# Patient Record
Sex: FEMALE | Race: WHITE | NOT HISPANIC OR LATINO | Employment: OTHER | URBAN - METROPOLITAN AREA
[De-identification: names, ages, dates, MRNs, and addresses within clinical notes are randomized per-mention and may not be internally consistent; named-entity substitution may affect disease eponyms.]

---

## 2017-03-22 ENCOUNTER — APPOINTMENT (OUTPATIENT)
Dept: RADIOLOGY | Facility: HOSPITAL | Age: 82
End: 2017-03-22
Attending: FAMILY MEDICINE
Payer: MEDICARE

## 2017-03-22 ENCOUNTER — OFFICE VISIT (OUTPATIENT)
Dept: FAMILY MEDICINE | Facility: CLINIC | Age: 82
End: 2017-03-22
Payer: MEDICARE

## 2017-03-22 VITALS
SYSTOLIC BLOOD PRESSURE: 122 MMHG | BODY MASS INDEX: 32.32 KG/M2 | RESPIRATION RATE: 20 BRPM | HEIGHT: 58 IN | DIASTOLIC BLOOD PRESSURE: 72 MMHG | TEMPERATURE: 98 F | HEART RATE: 84 BPM | OXYGEN SATURATION: 97 % | WEIGHT: 154 LBS

## 2017-03-22 DIAGNOSIS — M25.659 DECREASED RANGE OF HIP MOVEMENT: ICD-10-CM

## 2017-03-22 DIAGNOSIS — G89.29 CHRONIC PAIN OF BOTH ANKLES: ICD-10-CM

## 2017-03-22 DIAGNOSIS — M25.571 CHRONIC PAIN OF BOTH ANKLES: ICD-10-CM

## 2017-03-22 DIAGNOSIS — M25.659 DECREASED RANGE OF HIP MOVEMENT: Primary | ICD-10-CM

## 2017-03-22 DIAGNOSIS — M25.572 CHRONIC PAIN OF BOTH ANKLES: ICD-10-CM

## 2017-03-22 DIAGNOSIS — E66.9 OBESITY (BMI 30.0-34.9): ICD-10-CM

## 2017-03-22 PROBLEM — K21.9 GERD (GASTROESOPHAGEAL REFLUX DISEASE): Status: ACTIVE | Noted: 2017-03-22

## 2017-03-22 PROBLEM — E66.811 OBESITY (BMI 30.0-34.9): Status: ACTIVE | Noted: 2017-03-22

## 2017-03-22 PROCEDURE — 1159F MED LIST DOCD IN RCRD: CPT | Mod: S$GLB,,, | Performed by: FAMILY MEDICINE

## 2017-03-22 PROCEDURE — 73521 X-RAY EXAM HIPS BI 2 VIEWS: CPT | Mod: 26,,, | Performed by: RADIOLOGY

## 2017-03-22 PROCEDURE — 1125F AMNT PAIN NOTED PAIN PRSNT: CPT | Mod: S$GLB,,, | Performed by: FAMILY MEDICINE

## 2017-03-22 PROCEDURE — 1160F RVW MEDS BY RX/DR IN RCRD: CPT | Mod: S$GLB,,, | Performed by: FAMILY MEDICINE

## 2017-03-22 PROCEDURE — 99213 OFFICE O/P EST LOW 20 MIN: CPT | Mod: S$GLB,,, | Performed by: FAMILY MEDICINE

## 2017-03-22 PROCEDURE — 99999 PR PBB SHADOW E&M-EST. PATIENT-LVL III: CPT | Mod: PBBFAC,,, | Performed by: FAMILY MEDICINE

## 2017-03-22 PROCEDURE — 73521 X-RAY EXAM HIPS BI 2 VIEWS: CPT | Mod: TC,PN

## 2017-03-22 PROCEDURE — 1157F ADVNC CARE PLAN IN RCRD: CPT | Mod: S$GLB,,, | Performed by: FAMILY MEDICINE

## 2017-03-22 RX ORDER — ACETAMINOPHEN 500 MG
1000 TABLET ORAL EVERY 8 HOURS
Qty: 90 TABLET | Refills: 3 | Status: SHIPPED | OUTPATIENT
Start: 2017-03-22 | End: 2018-01-31

## 2017-03-22 NOTE — PROGRESS NOTES
"Routine Office Visit    Patient Name: Lucila Myers    : 1935  MRN: [id number]    Subjective:  Lucila is a 81 y.o. female who presents today for:    1.   Multiple joint pains - including chronic ankle pain which she's been dealing with for years.  She states that the R one is retaining some fluid today. She denies any injuries. She has arthritis in her hands and takes tramadol for this about 3 times a day.  She also has chronic knee pains, lower back pains worse on the L>R, hand pains with deformity.  She also states that ever since she was a younger girl she had hip height inequality, with the R side being elevated about 1/8 of an inch. She does not routinely wear an insole to account for this difference.  "It never really worked."  She's tried a R knee brace in the past with no improvement.  She states she is unable to move around with her 1 and 2 year old great grand kids and she wishes she could do more with them. She walks with a 4 prong cane now.      Past Medical History  Past Medical History:   Diagnosis Date    Arthritis        Past Surgical History  Past Surgical History:   Procedure Laterality Date    APPENDECTOMY      HYSTERECTOMY         Family History  Family History   Problem Relation Age of Onset    Rectal cancer Mother     Alcohol abuse Father        Social History  Social History     Social History    Marital status:      Spouse name: N/A    Number of children: N/A    Years of education: N/A     Occupational History    Not on file.     Social History Main Topics    Smoking status: Never Smoker    Smokeless tobacco: Not on file    Alcohol use No    Drug use: No    Sexual activity: No     Other Topics Concern    Not on file     Social History Narrative       Current Medications  Current Outpatient Prescriptions on File Prior to Visit   Medication Sig Dispense Refill    clotrimazole-betamethasone 1-0.05% (LOTRISONE) cream Apply topically 2 (two) times daily. 45 g 3    " "etodolac (LODINE) 400 MG tablet Take 1 tablet (400 mg total) by mouth 2 (two) times daily. 60 tablet 2    folic acid (FOLVITE) 1 MG tablet   0    omeprazole (PRILOSEC) 20 MG capsule Take 1 capsule (20 mg total) by mouth once daily. 90 capsule 2    tramadol (ULTRAM) 50 mg tablet TAKE 1 TABLET BY MOUTH THREE TIMES DAILY AS NEEDED FOR PAIN. NO MORE THAN 3 TABLETS IN 24 HOURS. 90 tablet 2    gabapentin (NEURONTIN) 100 MG capsule Take 1 capsule (100 mg total) by mouth every evening. 90 capsule 2     No current facility-administered medications on file prior to visit.        Allergies   Review of patient's allergies indicates:  No Known Allergies    Review of Systems (Pertinent positives)  Constititutional: Weight loss, excess fatigue, chills, fever, night sweats, weakness, loss of appetite  Ears: Earache, ringing in ears, discharge, hearing loss, hearing aid, popping, infection Nose: stuffy nose, mouth breathing, post-nasal drip,   Throat: hoarseness, bad breath, swelling, sore throat  Teeth: toothache, caries, dentures, bleeding gums  Lungs: Cough, sputum  Heart: Chest pain, angina, palpitations, extra heart beats  Stomach/Intestine: Heartburn, Nausea, vomiting, diarrhea, indigestion, bloating, constipation  Bones/Muscles/Joints: Joint pain, varicose veins, deformities, back pain, swelling, stiffness  Brain: Numbness, tingling, tremor, fainting, headaches, muscle weakness, frequent falls    /72  Pulse 84  Temp 98.2 °F (36.8 °C) (Oral)   Resp 20  Ht 4' 10" (1.473 m)  Wt 69.9 kg (154 lb)  LMP  (Exact Date)  SpO2 97%  BMI 32.19 kg/m2    GENERAL APPEARANCE: in no apparent distress and well developed and well nourished  RESPIRATORY: appears well, vitals normal, no respiratory distress, acyanotic, normal RR, chest clear, no wheezing, crepitations, rhonchi, normal symmetric air entry  HEART: regular rate and rhythm, S1, S2 normal, no murmur, click, rub or gallop.    NEUROLOGIC: normal without focal findings, " "CN II-XII are intact.  reflexes   Extremities: warm/well perfused.  No abnormal hair patterns.  No clubbing, cyanosis or edema.  +decreased ROM of R hip as patient unable to flex and externally rotate R hip to touch back of heel to her knee.  She is able to do this with her L foot.   SKIN: no rashes, no wounds, no other lesions  PSYCH: Alert, oriented x 3, thought content appropriate, speech normal, pleasant and cooperative, good eye contact, well groomed, recall good, concentration/judgement good and apparently average intelligence.    Assessment/Plan:  Lucila Myers is a 81 y.o. female who presents today for :    Lucila was seen today for arthritis.    Diagnoses and all orders for this visit:    Decreased range of hip movement  -     X-Ray Hips Bilateral 2 View Inc AP Pelvis; Future  -     Suggested tylenol as per below but also physical therapy.  She says, "at my age, you think physical therapy will help?"  We talked about how it may help.  She's tried orthotics but she isn't wearing one now.  She states they don't really help.  Consider ortho for joint issues.  Would like to try PT now but patient would like to wait.     Obesity (BMI 30.0-34.9)        -    Noted    Chronic pain of both ankles  -     acetaminophen (TYLENOL) 500 MG tablet; Take 2 tablets (1,000 mg total) by mouth every 8 (eight) hours.    F/u 1 month - decreased ROM hip movement  "

## 2017-03-22 NOTE — MR AVS SNAPSHOT
[facility]  [address] [facility]  [place] [address]  Phone: [phone]                  Lucila Myers   3/22/2017 2:20 PM   Office Visit    Description:  Female : 1935   Provider:  Alyson Ellison MD   Department:  [facility]           Reason for Visit     Arthritis           Diagnoses this Visit        Comments    Decreased range of hip movement    -  Primary            To Do List           Goals (5 Years of Data)     None       These Medications        Disp Refills Start End    acetaminophen (TYLENOL) 500 MG tablet 90 tablet 3 3/22/2017     Take 2 tablets (1,000 mg total) by mouth every 8 (eight) hours. - Oral    [address] #: [phone]         [facility] On Call     [facility] On Call Nurse Care Line -  Assistance  Registered nurses in the [facility] On Call Center provide clinical advisement, health education, appointment booking, and other advisory services.  Call for this free service at [phone].             Medications           Message regarding Medications     Verify the changes and/or additions to your medication regime listed below are the same as discussed with your clinician today.  If any of these changes or additions are incorrect, please notify your healthcare provider.        START taking these NEW medications        Refills    acetaminophen (TYLENOL) 500 MG tablet 3    Sig: Take 2 tablets (1,000 mg total) by mouth every 8 (eight) hours.    Class: Normal    Route: Oral      STOP taking these medications     etodolac (LODINE) 400 MG tablet Take 1 tablet (400 mg total) by mouth 2 (two) times daily.           Verify that the below list of medications is an accurate representation of the medications you are currently taking.  If none reported, the list may be blank. If incorrect, please contact your healthcare provider. Carry this list with you in case of emergency.  "          Current Medications     acetaminophen (TYLENOL) 500 MG tablet Take 2 tablets (1,000 mg total) by mouth every 8 (eight) hours.    clotrimazole-betamethasone 1-0.05% (LOTRISONE) cream Apply topically 2 (two) times daily.    folic acid (FOLVITE) 1 MG tablet     gabapentin (NEURONTIN) 100 MG capsule Take 1 capsule (100 mg total) by mouth every evening.    omeprazole (PRILOSEC) 20 MG capsule Take 1 capsule (20 mg total) by mouth once daily.    tramadol (ULTRAM) 50 mg tablet TAKE 1 TABLET BY MOUTH THREE TIMES DAILY AS NEEDED FOR PAIN. NO MORE THAN 3 TABLETS IN 24 HOURS.           Clinical Reference Information           Your Vitals Were     BP Pulse Temp Resp Height Weight    122/72 84 98.2 °F (36.8 °C) (Oral) 20 4' 10" (1.473 m) 69.9 kg (154 lb)    Last Period SpO2 BMI          (Exact Date) 97% 32.19 kg/m2        Blood Pressure          Most Recent Value    BP  122/72      Allergies as of 3/22/2017     No Known Allergies      Immunizations Administered on Date of Encounter - 3/22/2017     None      Orders Placed During Today's Visit     Future Labs/Procedures Expected by Expires    X-Ray Hips Bilateral 2 View Inc AP Pelvis  3/22/2017 3/22/2018      [facility] Sign-Up     Activating your [facility] account is as easy as 1-2-3!     1) Visit [email or web address], select Sign Up Now, enter this activation code and your date of birth, then select Next.  [email or web address]  Expires: 5/6/2017  3:25 PM      2) Create a username and password to use when you visit [facility] in the future and select a security question in case you lose your password and select Next.    3) Enter your e-mail address and click Sign Up!    Additional Information  If you have questions, please e-mail [email or web address] or call [phone] to talk to our [facility] staff. Remember, [facility] is NOT to be used for urgent needs. For medical emergencies, dial 911.         Language Assistance Services     ATTENTION: Language assistance services are " available, free of charge. Please call [phone].      ATENCIÓN: Si habla español, tiene a diana disposición servicios gratuitos de asistencia lingüística. Llame al [phone].     CHÚ Ý: N?u b?n nói Ti?ng Vi?t, có các d?ch v? h? tr? ngôn ng? mi?n phí dành cho b?n. G?i s? [phone].         [facility] complies with applicable Federal civil rights laws and does not discriminate on the basis of race, color, national origin, age, disability, or sex.

## 2017-03-29 ENCOUNTER — TELEPHONE (OUTPATIENT)
Dept: FAMILY MEDICINE | Facility: CLINIC | Age: 82
End: 2017-03-29

## 2017-03-29 DIAGNOSIS — M25.551 HIP PAIN, BILATERAL: ICD-10-CM

## 2017-03-29 DIAGNOSIS — M25.552 HIP PAIN, BILATERAL: ICD-10-CM

## 2017-03-29 DIAGNOSIS — M16.0 OSTEOARTHRITIS OF BOTH HIPS, UNSPECIFIED OSTEOARTHRITIS TYPE: Primary | ICD-10-CM

## 2017-03-29 NOTE — TELEPHONE ENCOUNTER
Please call patient and notify that:  - Xray of hips show degenerative changes in both hips.  Discussed with patient that she may see physical therapy first, to help with mobility.  May also see orthopedics.  We discussed this in her last visit and she was hesitant to go to PT.  I can put in the order per patient's request.     Sincerely  Dr Ellison

## 2017-04-07 ENCOUNTER — CLINICAL SUPPORT (OUTPATIENT)
Dept: REHABILITATION | Facility: HOSPITAL | Age: 82
End: 2017-04-07
Attending: FAMILY MEDICINE
Payer: MEDICARE

## 2017-04-07 DIAGNOSIS — M25.551 HIP PAIN, BILATERAL: ICD-10-CM

## 2017-04-07 DIAGNOSIS — M25.552 HIP PAIN, BILATERAL: ICD-10-CM

## 2017-04-07 DIAGNOSIS — M25.659 DECREASED RANGE OF HIP MOVEMENT: Primary | ICD-10-CM

## 2017-04-07 DIAGNOSIS — R29.898 WEAKNESS OF BOTH HIPS: ICD-10-CM

## 2017-04-07 DIAGNOSIS — M62.81 WEAKNESS OF TRUNK MUSCULATURE: ICD-10-CM

## 2017-04-07 PROCEDURE — G8979 MOBILITY GOAL STATUS: HCPCS | Mod: CK,PN | Performed by: PHYSICAL THERAPIST

## 2017-04-07 PROCEDURE — 97110 THERAPEUTIC EXERCISES: CPT | Mod: PN | Performed by: PHYSICAL THERAPIST

## 2017-04-07 PROCEDURE — G8978 MOBILITY CURRENT STATUS: HCPCS | Mod: CL,PN | Performed by: PHYSICAL THERAPIST

## 2017-04-07 PROCEDURE — 97162 PT EVAL MOD COMPLEX 30 MIN: CPT | Mod: PN | Performed by: PHYSICAL THERAPIST
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
## 2017-04-11 ENCOUNTER — CLINICAL SUPPORT (OUTPATIENT)
Dept: REHABILITATION | Facility: HOSPITAL | Age: 82
End: 2017-04-11
Attending: FAMILY MEDICINE
Payer: MEDICARE

## 2017-04-11 DIAGNOSIS — M25.579 CHRONIC ANKLE PAIN, UNSPECIFIED LATERALITY: ICD-10-CM

## 2017-04-11 DIAGNOSIS — M25.572 CHRONIC PAIN OF BOTH ANKLES: Primary | ICD-10-CM

## 2017-04-11 DIAGNOSIS — R29.898 WEAKNESS OF BOTH HIPS: ICD-10-CM

## 2017-04-11 DIAGNOSIS — M25.552 HIP PAIN, BILATERAL: ICD-10-CM

## 2017-04-11 DIAGNOSIS — M25.571 CHRONIC PAIN OF BOTH ANKLES: Primary | ICD-10-CM

## 2017-04-11 DIAGNOSIS — G89.29 CHRONIC ANKLE PAIN, UNSPECIFIED LATERALITY: ICD-10-CM

## 2017-04-11 DIAGNOSIS — M62.81 WEAKNESS OF TRUNK MUSCULATURE: ICD-10-CM

## 2017-04-11 DIAGNOSIS — M25.551 HIP PAIN, BILATERAL: ICD-10-CM

## 2017-04-11 DIAGNOSIS — G89.29 CHRONIC PAIN OF BOTH ANKLES: Primary | ICD-10-CM

## 2017-04-11 PROCEDURE — 97110 THERAPEUTIC EXERCISES: CPT | Mod: PN | Performed by: PHYSICAL THERAPIST

## 2017-04-11 RX ORDER — TRAMADOL HYDROCHLORIDE 50 MG/1
TABLET ORAL
Qty: 90 TABLET | Refills: 0 | Status: SHIPPED | OUTPATIENT
Start: 2017-04-11 | End: 2017-05-11 | Stop reason: SDUPTHER

## 2017-04-11 NOTE — PROGRESS NOTES
Name: Lucila REVELES [facility] Number: [id number]  Date of Treatment: 04/11/2017   Diagnosis:   Encounter Diagnoses   Name Primary?    Chronic pain of both ankles Yes    Hip pain, bilateral     Weakness of both hips     Weakness of trunk musculature        Time in: 1430  Time Out: 1530  Total Treatment Time: 60 Min  1:1 with PT 40 min    Subjective:    Lucila reports she has been doing her exercises.  She brought her exercise sheet that she was given by a prior PT.  Good program for core strengthening.  Patient reports their pain to be 7/10 on a 0-10 scale with 0 being no pain and 10 being the worst pain imaginable.    Objective    Patient received individual therapy to increase strength, flexibility, posture and core stabilization with activities as follows:     Lucila received therapeutic exercises to develop strength, flexibility, posture and core stabilization for 40 minutes including:   - Supine posterior pelvic tilt with TA activation  - Supine bridges 10 reps with 5 sec hold  - Sidelying hip ER (clams) 10 reps, 5 sec hold  - Supine hip abd with gentle resistance from PT 15 reps  - Supine ankle resistance exercise with yellow, Level 1 band   Ankle dorsiflexion   Ankle eversion - assistance required with right foot and no band   Ankle inversion  - Seated marble   - Seated active eversion with verbal cuing  - Standing hip abd 2 x 10  - Standing hip ext 1 x 10    Written Home Exercises Provided: Patient provided with yellow, red and green bands for band  with toes exercise. Pt demo good understanding of the education provided. Lucila demonstrated good return demonstration of activities.     Assessment:      Lucila presents to the clinic for physical therapy intervention to address bilateral foot pain and LE weakness; gait abnormality and balance deficits.  She has been performing her exercises at home and is doing well with them.  She tolerated today's exercises well.  She had difficulty with  strengthening exercises to the RLE, specifically hip abduction and foot eversion.  She requires skilled PT to progress her through an appropriate program to address functional deficits.  Pt will continue to benefit from skilled PT intervention. Medical Necessity is demonstrated by:  Pain limits function of effected part for some activities, Unable to participate fully in daily activities, Requires skilled supervision to complete and progress HEP and Weakness.    Patient is making good progress towards established goals.    New/Revised goals: Established at initial evaluation  Focus on Therapeutic Outcomes (FOTO)  Impairment 69%     Mobility  Severity 60% - 80% CL  Goal:  CK 40% - 60%     Rehab Potiential: good     Short Term Goals (4 Weeks 5/5/17):   1. Patient will be able to ambulate throughout store with no greater than 5/10 level ankle pain  2. Patient will report maximum level of pain at rest of 2/10  3. Patient will demonstrate hip musculature strength 3/5  4. Patient will be able to ascend stair without railing support     Long Term Goals/discharge goals (12 Weeks):   1. Patient will be able to ambulate sufficient distance to participate in activities with grand-children and complete shopping tasks with pain no greater than 4/10  2. Patient will be able to complete household tasks with least restrictive assistive device  3. Patient will be independent with self care and management of her condition  4. Impairment rating for mobility     Plan:  Certification Period: 4/7/17 to 7/7/17  Treatment Plan: 2 times per week for 12 weeks: Manual Therapy, Neuromuscular Re-ed, Therapeutic Activites, Therapeutic Exercise and Other Progression of home program  Continue with established Plan of Care towards PT goals.     Yoko Ramachandran, PT

## 2017-04-18 ENCOUNTER — CLINICAL SUPPORT (OUTPATIENT)
Dept: REHABILITATION | Facility: HOSPITAL | Age: 82
End: 2017-04-18
Attending: FAMILY MEDICINE
Payer: MEDICARE

## 2017-04-18 DIAGNOSIS — M79.671 FOOT PAIN, BILATERAL: ICD-10-CM

## 2017-04-18 DIAGNOSIS — M25.551 HIP PAIN, BILATERAL: Primary | ICD-10-CM

## 2017-04-18 DIAGNOSIS — R29.898 WEAKNESS OF BOTH HIPS: ICD-10-CM

## 2017-04-18 DIAGNOSIS — M62.81 WEAKNESS OF TRUNK MUSCULATURE: ICD-10-CM

## 2017-04-18 DIAGNOSIS — M25.552 HIP PAIN, BILATERAL: Primary | ICD-10-CM

## 2017-04-18 DIAGNOSIS — M79.672 FOOT PAIN, BILATERAL: ICD-10-CM

## 2017-04-18 PROCEDURE — 97140 MANUAL THERAPY 1/> REGIONS: CPT | Mod: PN | Performed by: PHYSICAL THERAPIST

## 2017-04-18 PROCEDURE — 97110 THERAPEUTIC EXERCISES: CPT | Mod: PN | Performed by: PHYSICAL THERAPIST

## 2017-04-18 NOTE — PROGRESS NOTES
"Name: Lucila REVELES [facility] Number: [id number]  Date of Treatment: 04/18/2017   Diagnosis:   Encounter Diagnoses   Name Primary?    Hip pain, bilateral Yes    Weakness of both hips     Weakness of trunk musculature        Time in: 1440  Time Out: 1535:   Total Treatment Time: 55 Min  1:1 with PT 35 min    Subjective:    Lucila reports her feet are feeling "better".  "I feel a little different, a little relief".  Patient reports their pain to be 6/10 on a 0-10 scale with 0 being no pain and 10 being the worst pain imaginable: "pretty good".    Objective    Patient received individual therapy to increase strength, flexibility, posture and core stabilization with activities as follows:     Lucila received therapeutic exercises to develop strength, flexibility, posture and core stabilization for 40 minutes including:     - Supine posterior pelvic tilt with TA activation, 10 reps, 10 sec hold  - Supine bridges 10 reps with 5 sec hold  - Sidelying hip ER (clams) 2 x 10 reps, 5 sec hold  - Supine hip abd with gentle resistance from PT 15 reps  - Sidelying hip ABD with verbal cuing from PT, 10 reps  - Supine ankle resistance exercise with yellow, Level 1 band, 15 reps   Ankle dorsiflexion   Ankle eversion    Ankle inversion  - Seated marble   - Seated active eversion with verbal cuing  - Standing hip abd 2 x 10  - Standing hip ext 1 x 10    Manual therapy provided to Lucila for both feet - grade II mobilization to all joints of the forefoot, mid-foot and rear foot.  Improved joint mobility noted with decreased pain with activity.  15 min    Written Home Exercises Provided: Patient provided with yellow, red and green bands for band  with toes exercise. Pt demo good understanding of the education provided. Lucila demonstrated good return demonstration of activities.     Assessment:      Lucila presents to the clinic for physical therapy intervention to address bilateral foot pain and LE weakness; gait abnormality " and balance deficits.  She has been performing her exercises at home and is doing well with them.  She tolerated today's exercises well.  She was able to perform sidelying hip abduction today, through mid-range.  She requires skilled PT to progress her through an appropriate program to address functional deficits.  Pt will continue to benefit from skilled PT intervention. Medical Necessity is demonstrated by:  Pain limits function of effected part for some activities, Unable to participate fully in daily activities, Requires skilled supervision to complete and progress HEP and Weakness.    Patient is making good progress towards established goals.    New/Revised goals: Established at initial evaluation  Focus on Therapeutic Outcomes (FOTO)  Impairment 69%     Mobility  Severity 60% - 80% CL  Goal:  CK 40% - 60%     Rehab Potiential: good     Short Term Goals (4 Weeks 5/5/17):   1. Patient will be able to ambulate throughout store with no greater than 5/10 level ankle pain  2. Patient will report maximum level of pain at rest of 2/10  3. Patient will demonstrate hip musculature strength 3/5  4. Patient will be able to ascend stair without railing support     Long Term Goals/discharge goals (12 Weeks):   1. Patient will be able to ambulate sufficient distance to participate in activities with grand-children and complete shopping tasks with pain no greater than 4/10  2. Patient will be able to complete household tasks with least restrictive assistive device  3. Patient will be independent with self care and management of her condition  4. Impairment rating for mobility     Plan:  Certification Period: 4/7/17 to 7/7/17  Treatment Plan: 2 times per week for 12 weeks: Manual Therapy, Neuromuscular Re-ed, Therapeutic Activites, Therapeutic Exercise and Other Progression of home program  Continue with established Plan of Care towards PT goals.     Yoko Ramachandran, PT

## 2017-04-21 ENCOUNTER — CLINICAL SUPPORT (OUTPATIENT)
Dept: REHABILITATION | Facility: HOSPITAL | Age: 82
End: 2017-04-21
Attending: FAMILY MEDICINE
Payer: MEDICARE

## 2017-04-21 DIAGNOSIS — M25.551 HIP PAIN, BILATERAL: Primary | ICD-10-CM

## 2017-04-21 DIAGNOSIS — M62.81 WEAKNESS OF TRUNK MUSCULATURE: ICD-10-CM

## 2017-04-21 DIAGNOSIS — M79.672 FOOT PAIN, BILATERAL: ICD-10-CM

## 2017-04-21 DIAGNOSIS — M79.671 FOOT PAIN, BILATERAL: ICD-10-CM

## 2017-04-21 DIAGNOSIS — M25.552 HIP PAIN, BILATERAL: Primary | ICD-10-CM

## 2017-04-21 DIAGNOSIS — R29.898 WEAKNESS OF BOTH HIPS: ICD-10-CM

## 2017-04-21 PROCEDURE — 97110 THERAPEUTIC EXERCISES: CPT | Mod: PN | Performed by: PHYSICAL THERAPIST

## 2017-04-21 NOTE — PROGRESS NOTES
"Name: Lucila REVELES [facility] Number: [id number]  Date of Treatment: 04/21/2017   Diagnosis:   Encounter Diagnoses   Name Primary?    Hip pain, bilateral Yes    Weakness of both hips     Weakness of trunk musculature     Foot pain, bilateral        Time in: 1435  Time Out: 1525  Total Treatment Time: 50 Min  1:1 with PT 35 min  Visit 4 of 24     Subjective:    Lucila reports that she is able to walk somewhat better and can see that the therapy is helping.  Patient does not report a pain level today. I'm doing "pretty good".    Objective    Patient received individual therapy to increase strength, flexibility, posture and core stabilization with activities as follows:     Lucila received therapeutic exercises to develop strength, flexibility, posture and core stabilization for 40 minutes including:     - Supine posterior pelvic tilt with TA activation, 2 x 10 reps, 10 sec hold  - Supine bridges 10 reps with 5 sec hold  - Sidelying hip ER (clams) 2 x 10 reps, 5 sec hold, yellow band  - Sidelying hip ABD with verbal cuing from PT, 10 reps - much improved  - Supine ankle resistance exercise with red, Level 2 band, 15 reps   Ankle dorsiflexion   Ankle eversion    Ankle inversion  - Seated marble  - reviewed for at home  - Standing hip abd 2 x 10  - Standing hip ext 1 x 10    Manual therapy provided to Lucila for both feet - grade II mobilization to all joints of the forefoot, mid-foot and rear foot.  Improved joint mobility noted with decreased pain with activity.  10 min    Written Home Exercises Reviewed: Band  with toes exercise. Pt demo good understanding of the education provided. Lucila demonstrated good return demonstration of activities.     Assessment:      Lucila presents to the clinic for physical therapy intervention to address bilateral foot pain and LE weakness; gait abnormality and balance deficits.  She has been performing her exercises at home and is doing well with them.  She tolerated today's " exercises well. She is demonstrating improved hip musculature strength and demonstrates improved posture.  She requires skilled PT to progress her through an appropriate program to address functional deficits.  Pt will continue to benefit from skilled PT intervention. Medical Necessity is demonstrated by:  Pain limits function of effected part for some activities, Unable to participate fully in daily activities, Requires skilled supervision to complete and progress HEP and Weakness.    Patient is making good progress towards established goals.    New/Revised goals: Established at initial evaluation  Focus on Therapeutic Outcomes (FOTO)  Impairment 69%     Mobility  Severity 60% - 80% CL  Goal:  CK 40% - 60%     Rehab Potiential: good     Short Term Goals (4 Weeks 5/5/17):   1. Patient will be able to ambulate throughout store with no greater than 5/10 level ankle pain  2. Patient will report maximum level of pain at rest of 2/10  3. Patient will demonstrate hip musculature strength 3/5  4. Patient will be able to ascend stair without railing support     Long Term Goals/discharge goals (12 Weeks):   1. Patient will be able to ambulate sufficient distance to participate in activities with grand-children and complete shopping tasks with pain no greater than 4/10  2. Patient will be able to complete household tasks with least restrictive assistive device  3. Patient will be independent with self care and management of her condition  4. Impairment rating for mobility     Plan:  Certification Period: 4/7/17 to 7/7/17  Treatment Plan: 2 times per week for 12 weeks: Manual Therapy, Neuromuscular Re-ed, Therapeutic Activites, Therapeutic Exercise and Other Progression of home program  Continue with established Plan of Care towards PT goals.     Yoko Ramachandran, PT

## 2017-04-25 ENCOUNTER — CLINICAL SUPPORT (OUTPATIENT)
Dept: REHABILITATION | Facility: HOSPITAL | Age: 82
End: 2017-04-25
Attending: FAMILY MEDICINE
Payer: MEDICARE

## 2017-04-25 DIAGNOSIS — M79.671 FOOT PAIN, BILATERAL: ICD-10-CM

## 2017-04-25 DIAGNOSIS — R29.898 WEAKNESS OF BOTH HIPS: ICD-10-CM

## 2017-04-25 DIAGNOSIS — M25.552 HIP PAIN, BILATERAL: Primary | ICD-10-CM

## 2017-04-25 DIAGNOSIS — M79.672 FOOT PAIN, BILATERAL: ICD-10-CM

## 2017-04-25 DIAGNOSIS — M25.551 HIP PAIN, BILATERAL: Primary | ICD-10-CM

## 2017-04-25 DIAGNOSIS — M62.81 WEAKNESS OF TRUNK MUSCULATURE: ICD-10-CM

## 2017-04-25 PROCEDURE — 97110 THERAPEUTIC EXERCISES: CPT | Mod: PN | Performed by: PHYSICAL THERAPIST

## 2017-04-25 NOTE — PROGRESS NOTES
"Name: Lucila REVLEES [facility] Number: [id number]  Date of Treatment: 04/25/2017   Diagnosis:   Encounter Diagnoses   Name Primary?    Hip pain, bilateral Yes    Weakness of both hips     Weakness of trunk musculature     Foot pain, bilateral        Time in: 1445  Time Out: 1530  Total Treatment Time: 45 Min  1:1 with PT 45 min  Visit 5 of 24     Subjective:    Lucila reports that she experiences pain in the right foot in the arch when she stands up after she has done her exercises.  Patient does not report a pain level today. I'm doing "pretty good".    Objective    Patient received individual therapy to increase strength, flexibility, posture and core stabilization with activities as follows:     Lucila received therapeutic exercises to develop strength, flexibility, posture and core stabilization for 40 minutes including:     - Supine posterior pelvic tilt with TA activation, 2 x 10 reps, 10 sec hold  - Supine bridges 10 reps with 5 sec hold  - Sidelying hip ER (clams) 2 x 10 reps, 5 sec hold, yellow band - did not perform  - Sidelying hip ABD with verbal cuing from PT, 10 reps - much improved  - Supine ankle resistance exercise with red, Level 2 band, 15 reps   Ankle dorsiflexion   Ankle eversion    Ankle inversion (yellow for left, manual assist with verbal cuing on right)  - Standing hip abd with ERx 10  - Standing hip ext 1 x 10  - Gait drills side steps x 25 ft each direction  - Gait with hand hold assist of PT 50ft x 2  - wall posture correction activity - back to wall - arm press x 2 min and verbal cuing for positioning    Manual therapy provided to Lucila for both feet - grade II mobilization to all joints of the forefoot, mid-foot and rear foot.  Improved joint mobility noted with decreased pain with activity.  5 min    Written Home Exercises Reviewed: Band  with toes exercise. Pt demo good understanding of the education provided. Lucila demonstrated good return demonstration of activities. "     Assessment:      Lucila presents to the clinic for physical therapy intervention to address bilateral foot pain and LE weakness; gait abnormality and balance deficits.  She has been performing her exercises at home and is doing well with them.  She did well with gait activities.  Frequent Verbal cuing required for feedback for lower extremity positioning to maintain hip in neutral. She is demonstrating improved hip musculature strength and demonstrates improved posture.  She requires skilled PT to progress her through an appropriate program to address functional deficits.  Pt will continue to benefit from skilled PT intervention. Medical Necessity is demonstrated by:  Pain limits function of effected part for some activities, Unable to participate fully in daily activities, Requires skilled supervision to complete and progress HEP and Weakness.    Patient is making good progress towards established goals.    New/Revised goals: Established at initial evaluation  Focus on Therapeutic Outcomes (FOTO)  Impairment 69%     Mobility  Severity 60% - 80% CL  Goal:  CK 40% - 60%     Rehab Potiential: good     Short Term Goals (4 Weeks 5/5/17):   1. Patient will be able to ambulate throughout store with no greater than 5/10 level ankle pain  2. Patient will report maximum level of pain at rest of 2/10  3. Patient will demonstrate hip musculature strength 3/5  4. Patient will be able to ascend stair without railing support     Long Term Goals/discharge goals (12 Weeks):   1. Patient will be able to ambulate sufficient distance to participate in activities with grand-children and complete shopping tasks with pain no greater than 4/10  2. Patient will be able to complete household tasks with least restrictive assistive device  3. Patient will be independent with self care and management of her condition  4. Impairment rating for mobility     Plan:  Certification Period: 4/7/17 to 7/7/17  Treatment Plan: 2 times per  week for 12 weeks: Manual Therapy, Neuromuscular Re-ed, Therapeutic Activites, Therapeutic Exercise and Other Progression of home program  Continue with established Plan of Care towards PT goals.     Yoko Ramachandran, PT

## 2017-04-28 ENCOUNTER — CLINICAL SUPPORT (OUTPATIENT)
Dept: REHABILITATION | Facility: HOSPITAL | Age: 82
End: 2017-04-28
Attending: FAMILY MEDICINE
Payer: MEDICARE

## 2017-04-28 DIAGNOSIS — M25.551 HIP PAIN, BILATERAL: Primary | ICD-10-CM

## 2017-04-28 DIAGNOSIS — R29.898 WEAKNESS OF BOTH HIPS: ICD-10-CM

## 2017-04-28 DIAGNOSIS — M79.672 FOOT PAIN, BILATERAL: ICD-10-CM

## 2017-04-28 DIAGNOSIS — M79.671 FOOT PAIN, BILATERAL: ICD-10-CM

## 2017-04-28 DIAGNOSIS — M25.552 HIP PAIN, BILATERAL: Primary | ICD-10-CM

## 2017-04-28 DIAGNOSIS — M62.81 WEAKNESS OF TRUNK MUSCULATURE: ICD-10-CM

## 2017-04-28 PROCEDURE — 97110 THERAPEUTIC EXERCISES: CPT | Mod: PN | Performed by: PHYSICAL THERAPIST

## 2017-04-28 NOTE — PROGRESS NOTES
Name: Lucila REVELES [facility] Number: [id number]  Date of Treatment: 04/28/2017   Diagnosis:   Encounter Diagnoses   Name Primary?    Hip pain, bilateral Yes    Weakness of both hips     Weakness of trunk musculature     Foot pain, bilateral        Time in: 1430  Time Out: 1510  Total Treatment Time: 40 Min  1:1 with PT 30 min  Visit 6 of 24     Subjective:    Lucila reports that she is improving.  She is able to walk better.  Patient does not report a pain level today.     Objective    Patient received individual therapy to increase strength, flexibility, posture and core stabilization with activities as follows:     Lucila received therapeutic exercises to develop strength, flexibility, posture and core stabilization for 30 minutes including:     - Supine posterior pelvic tilt with TA activation, 2 x 10 reps, 10 sec hold  - Supine bridges 2 x  10 reps with 5 sec hold  - Sidelying hip ER (clams) 2 x 10 reps, 5 sec hold, red band   - Sidelying hip ABD with verbal cuing from PT, 2 x 10 reps   - Supine ankle resistance exercise with red, Level 2 band, 15 reps   Ankle dorsiflexion   Ankle eversion    Ankle inversion (yellow for left, manual assist with verbal cuing on right)  Instruction in home exercise with red band self fix method.  Instruction sheet provided to patient2  - Standing hip abd with ER x 10  - Standing hip ext 1 x 10  - Gait drills side steps x 25 ft each direction  - Gait with hand hold assist of PT 50ft x 2  - wall posture correction activity - back to wall - arm press x 2 min and verbal cuing for positioning    Manual therapy provided to Lucila for both feet - grade II mobilization to all joints of the forefoot, mid-foot and rear foot.  Improved joint mobility noted with decreased pain with activity.  5 min    Written Home Exercises Reviewed: Band  with toes exercise. Pt demo good understanding of the education provided. Lucila demonstrated good return demonstration of activities.     Assessment:       Lucila presents to the clinic for physical therapy intervention to address bilateral foot pain and LE weakness; gait abnormality and balance deficits.  She has been performing her exercises at home and is doing well with them.  She did well with gait activities.  Patient is mindful to maintain hip in neutral during hip abduction activities.   She is demonstrating improved hip musculature strength and demonstrates improved posture.  She requires skilled PT to progress her through an appropriate program to address functional deficits.  Pt will continue to benefit from skilled PT intervention. Medical Necessity is demonstrated by:  Pain limits function of effected part for some activities, Unable to participate fully in daily activities, Requires skilled supervision to complete and progress HEP and Weakness.    Patient is making good progress towards established goals.    New/Revised goals: Established at initial evaluation  Focus on Therapeutic Outcomes (FOTO)  Impairment 69%     Mobility  Severity 60% - 80% CL  Goal:  CK 40% - 60%     Rehab Potiential: good     Short Term Goals (4 Weeks 5/5/17):   1. Patient will be able to ambulate throughout store with no greater than 5/10 level ankle pain  2. Patient will report maximum level of pain at rest of 2/10  3. Patient will demonstrate hip musculature strength 3/5  4. Patient will be able to ascend stair without railing support     Long Term Goals/discharge goals (12 Weeks):   1. Patient will be able to ambulate sufficient distance to participate in activities with grand-children and complete shopping tasks with pain no greater than 4/10  2. Patient will be able to complete household tasks with least restrictive assistive device  3. Patient will be independent with self care and management of her condition  4. Impairment rating for mobility     Plan:  Certification Period: 4/7/17 to 7/7/17  Treatment Plan: 2 times per week for 12 weeks: Manual Therapy,  Neuromuscular Re-ed, Therapeutic Activites, Therapeutic Exercise and Other Progression of home program  Continue with established Plan of Care towards PT goals.     Yoko Ramachandran, PT

## 2017-05-05 ENCOUNTER — CLINICAL SUPPORT (OUTPATIENT)
Dept: REHABILITATION | Facility: HOSPITAL | Age: 82
End: 2017-05-05
Attending: FAMILY MEDICINE
Payer: MEDICARE

## 2017-05-05 DIAGNOSIS — M25.551 HIP PAIN, BILATERAL: Primary | ICD-10-CM

## 2017-05-05 DIAGNOSIS — M62.81 WEAKNESS OF TRUNK MUSCULATURE: ICD-10-CM

## 2017-05-05 DIAGNOSIS — R29.898 WEAKNESS OF BOTH HIPS: ICD-10-CM

## 2017-05-05 DIAGNOSIS — M25.552 HIP PAIN, BILATERAL: Primary | ICD-10-CM

## 2017-05-05 DIAGNOSIS — M79.671 FOOT PAIN, BILATERAL: ICD-10-CM

## 2017-05-05 DIAGNOSIS — M79.672 FOOT PAIN, BILATERAL: ICD-10-CM

## 2017-05-05 PROCEDURE — 97110 THERAPEUTIC EXERCISES: CPT | Mod: PN | Performed by: PHYSICAL THERAPIST

## 2017-05-05 NOTE — PROGRESS NOTES
"Progress Summary Report    Name: Lucila REVELES [facility] Number: [id number]  Date of Treatment: 05/05/2017   Diagnosis:   Encounter Diagnoses   Name Primary?    Hip pain, bilateral Yes    Weakness of both hips     Weakness of trunk musculature     Foot pain, bilateral        Time in: 1440  Time Out: 1530  Total Treatment Time: 50 Min  1:1 with PT 30 min  Visit 8 of 24     Subjective:    Lucila reports that she is improving.  She is able to walk better.  She reports that the burning pain in her feet is resolving.  Patient does not report a pain level today.     Objective    Patient received individual therapy to increase strength, flexibility, posture and core stabilization with activities as follows:     Lucila received therapeutic exercises to develop strength, flexibility, posture and core stabilization for 30 minutes including:     - Supine posterior pelvic tilt with TA activation, 2 x 10 reps, 10 sec hold  - Supine bridges 2 x  10 reps with 5 sec hold  - Sidelying hip ER (clams) 2 x 10 reps, 5 sec hold, red band   - Sidelying hip ABD with verbal cuing from PT, 2 x 10 reps   - Supine ankle resistance exercise with red, Level 2 band, 25 reps   Ankle dorsiflexion   Ankle eversion    Ankle inversion (manual assist from PT for left, red band with verbal cuing on right)   Ankle plantarflexion   Reviewed  home exercise with red band self fix method.  Instruction sheet provided to patient2  - Standing hip abd with ER x 10  - Standing hip ext 1 x 10  - Step ups on 4" step, 10 reps lead each LE.  Patient requires assist of railing to ascend steps    Manual therapy provided to Lucila for both feet - grade II mobilization to all joints of the forefoot, mid-foot and rear foot.  Improved joint mobility noted with decreased pain with activity.  5 min    Written Home Exercises Reviewed: Pt demo good understanding of the education provided. Lucila demonstrated good return demonstration of activities.     Assessment:      Lucila " presents to the clinic for physical therapy intervention to address bilateral foot pain and LE weakness; gait abnormality and balance deficits.  She has improved by 9 points on her FOTO score and is at a 58% impairment level compared with 69% at initial evaluation.  She is showing improvement in ankle and foot strength, as well as hip strength.  Dynamic activities with emphasis on gait skills are tolerated well.  Therapy sessions have  progressed to stair climbing.  She is demonstrating improved hip musculature strength and demonstrates improved posture.  She requires skilled PT to progress her through an appropriate program to address functional deficits.  Pt will continue to benefit from skilled PT intervention. Medical Necessity is demonstrated by:  Pain limits function of effected part for some activities, Unable to participate fully in daily activities, Requires skilled supervision to complete and progress HEP and Weakness.    Patient is making good progress towards established goals.    New/Revised goals: Established at initial evaluation  Focus on Therapeutic Outcomes (FOTO)  Impairment 69%    FOTO 5/5/17  Impairment: 58%    Mobility  Severity 60% - 80% CL  Goal:  CK 40% - 60%     Rehab Potiential: good     Short Term Goals (4 Weeks 5/26/17):   1. Patient will be able to ambulate throughout store with no greater than 5/10 level ankle pain - In progress  2. Patient will report maximum level of pain at rest of 2/10 - in progress  3. Patient will demonstrate hip musculature strength 3/5 - Met  4. Patient will be able to ascend stair without railing support - initiated     Long Term Goals/discharge goals (12 Weeks):   1. Patient will be able to ambulate sufficient distance to participate in activities with grand-children and complete shopping tasks with pain no greater than 4/10  2. Patient will be able to complete household tasks with least restrictive assistive device  3. Patient will be independent  with self care and management of her condition  4. Impairment rating for mobility     Plan:  Certification Period: 4/7/17 to 7/7/17  Treatment Plan: 2 times per week for 12 weeks: Manual Therapy, Neuromuscular Re-ed, Therapeutic Activites, Therapeutic Exercise and Other Progression of home program  Continue with established Plan of Care towards PT goals.     Yoko Ramachandran, PT

## 2017-05-10 ENCOUNTER — CLINICAL SUPPORT (OUTPATIENT)
Dept: REHABILITATION | Facility: HOSPITAL | Age: 82
End: 2017-05-10
Attending: FAMILY MEDICINE
Payer: MEDICARE

## 2017-05-10 DIAGNOSIS — M79.672 FOOT PAIN, BILATERAL: ICD-10-CM

## 2017-05-10 DIAGNOSIS — M25.551 HIP PAIN, BILATERAL: Primary | ICD-10-CM

## 2017-05-10 DIAGNOSIS — R29.898 WEAKNESS OF BOTH HIPS: ICD-10-CM

## 2017-05-10 DIAGNOSIS — M62.81 WEAKNESS OF TRUNK MUSCULATURE: ICD-10-CM

## 2017-05-10 DIAGNOSIS — M25.552 HIP PAIN, BILATERAL: Primary | ICD-10-CM

## 2017-05-10 DIAGNOSIS — M79.671 FOOT PAIN, BILATERAL: ICD-10-CM

## 2017-05-10 PROCEDURE — 97110 THERAPEUTIC EXERCISES: CPT | Mod: PN | Performed by: PHYSICAL THERAPIST

## 2017-05-10 NOTE — PROGRESS NOTES
"Progress Summary Report    Name: Lucila REVELES [facility] Number: [id number]  Date of Treatment: 05/10/2017   Diagnosis:   Encounter Diagnoses   Name Primary?    Hip pain, bilateral Yes    Weakness of both hips     Weakness of trunk musculature     Foot pain, bilateral        Time in: 1435  Time Out: 1520  Total Treatment Time:45 Min  1:1 with PT 40 min  Visit 8 of 24     Subjective:    Lucila reports that she is improving.  She continues to have "arthritis pain" in hands, shoulder, feet.  She reports that the burning pain in her feet is resolving.  She no longer has constant pain.  She experiences pain if she is on her feet for extended periods.  Patient does not report a pain level today.     Objective    Ankle inversion right - 2+/5 - patient is able to move through full range of motion without assist from PT  Hip abduction 3-/5    Patient received individual therapy to increase strength, flexibility, posture and core stabilization with activities as follows:     Lucila received therapeutic exercises to develop strength, flexibility, posture and core stabilization for 40 minutes including:     - [place]ava[place] pick ups with each foot  - Supine posterior pelvic tilt with TA activation, 2 x 10 reps, 10 sec hold - reviewed for home exercise.  Patient demonstrates exercise correctly  - Supine bridges 2 x  10 reps with 5 sec hold  - Sidelying hip ER (clams) 2 x 10 reps, 5 sec hold, red band   - Sidelying hip ABD with verbal cuing from PT, 2 x 10 reps   - Supine ankle resistance exercise with red, Level 2 band, 25 reps   Ankle dorsiflexion   Ankle eversion    Ankle inversion (active for left, red band with verbal cuing on right)   Ankle plantarflexion   - Standing hip abd with ER 20  - Standing hip ext 1 x 20  - Standing hip flexion 1 x 20  - Standing side glide of trunk on pelvis for posture correction - Mirror for visual feedback, tactile positioning from PT and verbal cuing  - Standing posture correction with overhead arm " "raise - mirror for visual feedback, verbal cuing from PT    Did not perform today  - Step ups on 4" step, 10 reps lead each LE.  Patient requires assist of railing to ascend steps    Written Home Exercises Reviewed: Pt demo good understanding of the education provided. Lucila demonstrated good return demonstration of activities.     Assessment:      Lucila presents to the clinic for physical therapy intervention to address bilateral foot pain and LE weakness; gait abnormality and balance deficits.  Lucila is improving with LE strength and control of the ankle musculature.  Hip strength is improving.  Gait throughout clinic without assistive device.  Progressed to standing postural exercise with correction of lateral shift.  Patient is able to maintain correct postural position for short periods.  Pt will continue to benefit from skilled PT intervention. Medical Necessity is demonstrated by:  Pain limits function of effected part for some activities, Unable to participate fully in daily activities, Requires skilled supervision to complete and progress HEP and Weakness.    Patient is making good progress towards established goals.    New/Revised goals: Established at initial evaluation  Focus on Therapeutic Outcomes (FOTO)  Impairment 69%    FOTO 5/5/17  Impairment: 58%    Mobility  Severity 60% - 80% CL  Goal:  CK 40% - 60%     Rehab Potiential: good     Short Term Goals (4 Weeks 5/26/17):   1. Patient will be able to ambulate throughout store with no greater than 5/10 level ankle pain - In progress  2. Patient will report maximum level of pain at rest of 2/10 - in progress  3. Patient will demonstrate hip musculature strength 3/5 - Met  4. Patient will be able to ascend stair without railing support - initiated     Long Term Goals/discharge goals (12 Weeks):   1. Patient will be able to ambulate sufficient distance to participate in activities with grand-children and complete shopping tasks with pain no " greater than 4/10  2. Patient will be able to complete household tasks with least restrictive assistive device  3. Patient will be independent with self care and management of her condition  4. Impairment rating for mobility     Plan:  Certification Period: 4/7/17 to 7/7/17  Treatment Plan: 2 times per week for 12 weeks: Manual Therapy, Neuromuscular Re-ed, Therapeutic Activites, Therapeutic Exercise and Other Progression of home program  Continue with established Plan of Care towards PT goals.     Yoko Ramachandran, PT

## 2017-05-11 DIAGNOSIS — M25.579 CHRONIC ANKLE PAIN, UNSPECIFIED LATERALITY: ICD-10-CM

## 2017-05-11 DIAGNOSIS — G89.29 CHRONIC ANKLE PAIN, UNSPECIFIED LATERALITY: ICD-10-CM

## 2017-05-11 RX ORDER — TRAMADOL HYDROCHLORIDE 50 MG/1
TABLET ORAL
Qty: 90 TABLET | Refills: 0 | Status: SHIPPED | OUTPATIENT
Start: 2017-05-11 | End: 2017-05-16 | Stop reason: SDUPTHER

## 2017-05-11 NOTE — TELEPHONE ENCOUNTER
Unable to reach patient by home phone are cell phone to leave a message that script is ready for  at the clinic.

## 2017-05-15 DIAGNOSIS — G89.29 CHRONIC ANKLE PAIN, UNSPECIFIED LATERALITY: ICD-10-CM

## 2017-05-15 DIAGNOSIS — M25.579 CHRONIC ANKLE PAIN, UNSPECIFIED LATERALITY: ICD-10-CM

## 2017-05-15 RX ORDER — TRAMADOL HYDROCHLORIDE 50 MG/1
TABLET ORAL
Qty: 90 TABLET | Refills: 0 | OUTPATIENT
Start: 2017-05-15

## 2017-05-16 DIAGNOSIS — G89.29 CHRONIC ANKLE PAIN, UNSPECIFIED LATERALITY: ICD-10-CM

## 2017-05-16 DIAGNOSIS — M25.579 CHRONIC ANKLE PAIN, UNSPECIFIED LATERALITY: ICD-10-CM

## 2017-05-16 RX ORDER — TRAMADOL HYDROCHLORIDE 50 MG/1
TABLET ORAL
Qty: 90 TABLET | Refills: 1 | Status: SHIPPED | OUTPATIENT
Start: 2017-05-16 | End: 2017-06-28 | Stop reason: DRUGHIGH

## 2017-05-16 RX ORDER — TRAMADOL HYDROCHLORIDE 50 MG/1
TABLET ORAL
Qty: 90 TABLET | Refills: 0 | OUTPATIENT
Start: 2017-05-16

## 2017-05-16 NOTE — TELEPHONE ENCOUNTER
----- Message from Estrella Wood sent at 5/16/2017  9:25 AM CDT -----  Contact: SELF  Calling regarding status of refill for Tramadol . It was printed on 5-11-17 . Is it ready for  ?        [phone]    LL

## 2017-05-19 ENCOUNTER — CLINICAL SUPPORT (OUTPATIENT)
Dept: REHABILITATION | Facility: HOSPITAL | Age: 82
End: 2017-05-19
Attending: FAMILY MEDICINE
Payer: MEDICARE

## 2017-05-19 DIAGNOSIS — M79.671 FOOT PAIN, BILATERAL: ICD-10-CM

## 2017-05-19 DIAGNOSIS — M62.81 WEAKNESS OF TRUNK MUSCULATURE: ICD-10-CM

## 2017-05-19 DIAGNOSIS — R29.898 WEAKNESS OF BOTH HIPS: ICD-10-CM

## 2017-05-19 DIAGNOSIS — M79.672 FOOT PAIN, BILATERAL: ICD-10-CM

## 2017-05-19 DIAGNOSIS — M25.552 HIP PAIN, BILATERAL: Primary | ICD-10-CM

## 2017-05-19 DIAGNOSIS — M25.551 HIP PAIN, BILATERAL: Primary | ICD-10-CM

## 2017-05-19 PROCEDURE — 97110 THERAPEUTIC EXERCISES: CPT | Mod: PN | Performed by: PHYSICAL THERAPIST

## 2017-05-19 NOTE — PROGRESS NOTES
Progress Summary Report    Name: Lucila REVELES [facility] Number: [id number]  Date of Treatment: 05/19/2017   Diagnosis:   Encounter Diagnoses   Name Primary?    Hip pain, bilateral Yes    Weakness of both hips     Weakness of trunk musculature     Foot pain, bilateral        Time in: 1440  Time Out: 1520  Total Treatment Time: 40 Min  1:1 with PT 30 min  Visit 9 of 24     Subjective:    Lucila reports that she is improving.  She reports that the burning pain in her feet is much improved.  She is able to walk around the house without the cane.  She experiences pain in her ankles if she is on her feet for extended periods.      Objective    Ankle inversion right - 2+/5 - patient is able to move through full range of motion without assist from PT  Hip abduction 3-/5    Patient received individual therapy to increase strength, flexibility, posture and core stabilization with activities as follows:     Lucila received therapeutic exercises to develop strength, flexibility, posture and core stabilization for 40 minutes including:     - Supine posterior pelvic tilt with TA activation, 2 x 10 reps, 10 sec hold - reviewed for home exercise.  Patient demonstrates exercise correctly  - Supine bridges 2 x  10 reps with 5 sec hold  - Sidelying hip ABD with verbal cuing from PT, 3 x 10 reps   - Supine ankle resistance exercise with red, Level 2 band, 25 reps   Ankle dorsiflexion   Ankle eversion    Ankle inversion (active for left, red band with verbal cuing on right)   Ankle plantarflexion with green 3 x 20 reps  - Standing hip abd with 20  - Standing hip ext 2 x 20 with verbal cuing for form  - Standing hip flexion 2 x 20  - Standing side glide of trunk on pelvis for posture correction - Mirror for visual feedback, tactile positioning from PT and verbal cuing  - Standing posture correction with overhead arm raise - mirror for visual feedback, verbal cuing from PT  - Seated stepper x 5 min    Did not perform today  - Step ups on  "4" step, 10 reps lead each LE.  Patient requires assist of railing to ascend steps    Written Home Exercises Reviewed: Pt demo good understanding of the education provided. Lucila demonstrated good return demonstration of activities.     Assessment:      Lucila presents to the clinic for physical therapy intervention to address bilateral foot pain and LE weakness; gait abnormality and balance deficits.  Lucila is improving with LE strength and control of the ankle musculature.  Hip strength is improving.  Gait throughout clinic without assistive device.  Patient is tolerating standing postural exercise with correction of lateral shift.  Extended static standing causes increased ankle joint pain.  Patient is able to maintain correct postural position for short periods.  Pt will continue to benefit from skilled PT intervention. Medical Necessity is demonstrated by:  Pain limits function of effected part for some activities, Unable to participate fully in daily activities, Requires skilled supervision to complete and progress HEP and Weakness.    Patient is making good progress towards established goals.    New/Revised goals: Established at initial evaluation  Focus on Therapeutic Outcomes (FOTO)  Impairment 69%    FOTO 5/5/17  Impairment: 58%    Mobility  Severity 60% - 80% CL  Goal:  CK 40% - 60%     Rehab Potiential: good     Short Term Goals (4 Weeks 5/26/17):   1. Patient will be able to ambulate throughout store with no greater than 5/10 level ankle pain - In progress  2. Patient will report maximum level of pain at rest of 2/10 - in progress  3. Patient will demonstrate hip musculature strength 3/5 - Met  4. Patient will be able to ascend stair without railing support - initiated     Long Term Goals/discharge goals (12 Weeks):   1. Patient will be able to ambulate sufficient distance to participate in activities with grand-children and complete shopping tasks with pain no greater than 4/10  2. Patient " will be able to complete household tasks with least restrictive assistive device  3. Patient will be independent with self care and management of her condition  4. Impairment rating for mobility     Plan:  Certification Period: 4/7/17 to 7/7/17  Treatment Plan: 2 times per week for 12 weeks: Manual Therapy, Neuromuscular Re-ed, Therapeutic Activites, Therapeutic Exercise and Other Progression of home program  Continue with established Plan of Care towards PT goals.     Yoko Ramachandran, PT

## 2017-06-02 ENCOUNTER — CLINICAL SUPPORT (OUTPATIENT)
Dept: REHABILITATION | Facility: HOSPITAL | Age: 82
End: 2017-06-02
Attending: FAMILY MEDICINE
Payer: MEDICARE

## 2017-06-02 DIAGNOSIS — M25.572 CHRONIC PAIN OF BOTH ANKLES: ICD-10-CM

## 2017-06-02 DIAGNOSIS — M25.659 DECREASED RANGE OF HIP MOVEMENT: ICD-10-CM

## 2017-06-02 DIAGNOSIS — M79.671 FOOT PAIN, BILATERAL: ICD-10-CM

## 2017-06-02 DIAGNOSIS — G89.29 CHRONIC PAIN OF BOTH ANKLES: ICD-10-CM

## 2017-06-02 DIAGNOSIS — R29.898 WEAKNESS OF BOTH HIPS: ICD-10-CM

## 2017-06-02 DIAGNOSIS — M25.552 HIP PAIN, BILATERAL: Primary | ICD-10-CM

## 2017-06-02 DIAGNOSIS — M62.81 WEAKNESS OF TRUNK MUSCULATURE: ICD-10-CM

## 2017-06-02 DIAGNOSIS — M25.571 CHRONIC PAIN OF BOTH ANKLES: ICD-10-CM

## 2017-06-02 DIAGNOSIS — M79.672 FOOT PAIN, BILATERAL: ICD-10-CM

## 2017-06-02 DIAGNOSIS — M25.551 HIP PAIN, BILATERAL: Primary | ICD-10-CM

## 2017-06-02 PROCEDURE — 97110 THERAPEUTIC EXERCISES: CPT | Mod: PN

## 2017-06-02 NOTE — PROGRESS NOTES
"Progress Summary Report    Name: Lucila REVELES [facility] Number: [id number]  Date of Treatment: 06/02/2017   Diagnosis:   Encounter Diagnoses   Name Primary?    Hip pain, bilateral Yes    Weakness of both hips     Weakness of trunk musculature     Foot pain, bilateral     Chronic pain of both ankles     Decreased range of hip movement        Time in: 1435  Time Out: 1540  Total Treatment Time: 65'  1:1 with PTA 30 min  Visit 10 of 24     Subjective:    Lucila reports that she feels that therapy is really helping.  Patient states that she still can not  her ankle for prolonged period of time.  Pt states that she had to take 3 rest breaks while attempting to wash her deep fryer.    Objective    Patient received individual therapy to increase strength, flexibility, posture and core stabilization with activities as follows:     Lucila received therapeutic exercises to develop strength, flexibility, posture and core stabilization for 40 minutes including:       - Supine bridges 2 x  10 reps with 5 sec hold  - Sidelying hip ABD with verbal/Tatcile cuing from PTA, 3 x 10 reps   - Supine ankle resistance exercise with green, Level 3 band, 3 x 10 reps   Ankle dorsiflexion   Ankle eversion    Ankle inversion (active for left, red band with verbal cuing on right)   Ankle plantarflexion with green 3 x 20 reps  - Standing hip abd with 20  - Standing hip ext 2 x 20 with verbal cuing for form  - Standing hip flexion 2 x 20  - Standing side glide of trunk on pelvis for posture correction x 3' - Mirror for visual feedback, tactile positioning from PTA and verbal cuing  - Standing posture correction with overhead arm raise - mirror for visual feedback, verbal cuing from PT  + Standing Inv/Evr on Red inflatable disc x 20 ea LE   + Upright Bike x 6'   - Step ups on 4" step,5 reps lead each LE    Not Performed:   - Seated stepper x 5 min  - Supine posterior pelvic tilt with TA activation, 2 x 10 reps, 10 sec hold - reviewed for " home exercise.  Patient demonstrates exercise correctly      Written Home Exercises Reviewed: Pt demo good understanding of the education provided. Lucila demonstrated good return demonstration of activities.     Assessment:     Lucila presents to the clinic for physical therapy intervention to address bilateral foot pain and LE weakness; gait abnormality and balance deficits.  Patient tolerated treatment session very well.  Patient demonstrated difficulty with performance of ankle inversion, requiring heavy cueing to decrease compensation from the hip.  Patient with good tolerance with progression of standing exercises, however required seated rest break due to slight provocation of pain in the right ankle with standing Inversion of the red inflatable disc. Patient with focus on maintaining correct posture alignment with standing exercises. Pt will continue to benefit from skilled PT intervention. Medical Necessity is demonstrated by:  Pain limits function of effected part for some activities, Unable to participate fully in daily activities, Requires skilled supervision to complete and progress HEP and Weakness.    Patient is making good progress towards established goals.    New/Revised goals: Established at initial evaluation  Focus on Therapeutic Outcomes (FOTO)  Impairment 69%    FOTO 5/5/17  Impairment: 58%    Mobility  Severity 60% - 80% CL  Goal:  CK 40% - 60%     Rehab Potiential: good     Short Term Goals (4 Weeks 5/26/17):   1. Patient will be able to ambulate throughout store with no greater than 5/10 level ankle pain - In progress  2. Patient will report maximum level of pain at rest of 2/10 - in progress  3. Patient will demonstrate hip musculature strength 3/5 - Met  4. Patient will be able to ascend stair without railing support - initiated     Long Term Goals/discharge goals (12 Weeks):   1. Patient will be able to ambulate sufficient distance to participate in activities with grand-children  and complete shopping tasks with pain no greater than 4/10  2. Patient will be able to complete household tasks with least restrictive assistive device  3. Patient will be independent with self care and management of her condition  4. Impairment rating for mobility     Plan:  Certification Period: 4/7/17 to 7/7/17  Treatment Plan: 2 times per week for 12 weeks: Manual Therapy, Neuromuscular Re-ed, Therapeutic Activites, Therapeutic Exercise and Other Progression of home program  Continue with established Plan of Care towards PT goals.     Yoko Ramachandran, PT

## 2017-06-06 ENCOUNTER — CLINICAL SUPPORT (OUTPATIENT)
Dept: REHABILITATION | Facility: HOSPITAL | Age: 82
End: 2017-06-06
Attending: FAMILY MEDICINE
Payer: MEDICARE

## 2017-06-06 DIAGNOSIS — G89.29 CHRONIC PAIN OF BOTH ANKLES: Primary | ICD-10-CM

## 2017-06-06 DIAGNOSIS — M62.81 WEAKNESS OF TRUNK MUSCULATURE: ICD-10-CM

## 2017-06-06 DIAGNOSIS — M25.571 CHRONIC PAIN OF BOTH ANKLES: Primary | ICD-10-CM

## 2017-06-06 DIAGNOSIS — M25.552 HIP PAIN, BILATERAL: ICD-10-CM

## 2017-06-06 DIAGNOSIS — R29.898 WEAKNESS OF BOTH HIPS: ICD-10-CM

## 2017-06-06 DIAGNOSIS — M25.551 HIP PAIN, BILATERAL: ICD-10-CM

## 2017-06-06 DIAGNOSIS — M25.572 CHRONIC PAIN OF BOTH ANKLES: Primary | ICD-10-CM

## 2017-06-06 DIAGNOSIS — M79.671 FOOT PAIN, BILATERAL: ICD-10-CM

## 2017-06-06 DIAGNOSIS — M79.672 FOOT PAIN, BILATERAL: ICD-10-CM

## 2017-06-06 PROCEDURE — 97110 THERAPEUTIC EXERCISES: CPT | Mod: PN | Performed by: PHYSICAL THERAPIST

## 2017-06-15 NOTE — PLAN OF CARE
"UPDATED PLAN OF CARE    Subjective:     Lucila reports that she feels that therapy is really helping.  She states that the numbness is gone, but that she continues to have pain with prolonged walking and standing.     Objective     Patient received individual therapy to increase strength, flexibility, posture and core stabilization with activities as follows:      Lucila received therapeutic exercises to develop strength, flexibility, posture and core stabilization for 40 minutes including:        - Supine bridges 2 x  10 reps with 5 sec hold  - Supine ankle resistance exercise with manual resistance from PT 20 reps                        Ankle dorsiflexion                        Ankle eversion                         Ankle inversion   - Standing hip abd with 20  - Standing hip ext 2 x 20 with verbal cuing for form  - Standing hip flexion 2 x 20  - Standing side glide of trunk on pelvis for posture correction x 3' - Mirror for visual feedback, tactile positioning from PT and verbal cuing  - Standing posture correction with overhead arm raise - mirror for visual feedback, verbal cuing from PT  - Upright Bike x 10'   - Step ups on 4" step, 10 reps lead each LE     Written Home Exercises Reviewed: Pt demo good understanding of the education provided. Lucila demonstrated good return demonstration of activities. Patient is compliant with her home program and is doing well with her activities.     Assessment:      Lucila presents to the clinic for physical therapy intervention to address bilateral foot pain and LE weakness; gait abnormality and balance deficits.  Patient tolerated treatment session very well.  Patient is showing improvement in active ankle inversion, but continues to require persistent cuing to decrease compensation from the hip.  Patient with good tolerance with progression of standing exercises, however required seated rest break due to slight provocation of pain in the right ankle with standing Inversion " of the red inflatable disc. Patient with focus on maintaining correct posture alignment with standing exercises. Pt will continue to benefit from skilled PT intervention. Medical Necessity is demonstrated by:  Pain limits function of effected part for some activities, Unable to participate fully in daily activities, Requires skilled supervision to complete and progress HEP and Weakness.     Patient is making good progress towards established goals.     New/Revised goals: Established at initial evaluation  Focus on Therapeutic Outcomes (FOTO)  Impairment 69%     FOTO 5/5/17  Impairment: 58%     Mobility  Severity 60% - 80% CL  Goal:  CK 40% - 60%     Rehab Potiential: good     Short Term Goals (4 Weeks 6/26/17):   1. Patient will be able to ambulate throughout store with no greater than 5/10 level ankle pain - In progress  2. Patient will report maximum level of pain at rest of 2/10 - in progress  3. Patient will demonstrate hip musculature strength 3/5 - Met  4. Patient will be able to ascend stair without railing support - initiated     Long Term Goals/discharge goals (12 Weeks):   1. Patient will be able to ambulate sufficient distance to participate in activities with grand-children and complete shopping tasks with pain no greater than 4/10  2. Patient will be able to complete household tasks with least restrictive assistive device  3. Patient will be independent with self care and management of her condition  4. Impairment rating for mobility      Plan:  Certification Period: 4/7/17 to 7/7/17  Treatment Plan: 2 times per week for 12 weeks: Manual Therapy, Neuromuscular Re-ed, Therapeutic Activites, Therapeutic Exercise and Other Progression of home program  Continue with established Plan of Care towards PT goals.      Yoko Ramachandran, PT

## 2017-06-16 DIAGNOSIS — R29.898 WEAKNESS OF BOTH HIPS: ICD-10-CM

## 2017-06-16 DIAGNOSIS — M25.552 HIP PAIN, BILATERAL: Primary | ICD-10-CM

## 2017-06-16 DIAGNOSIS — M25.551 HIP PAIN, BILATERAL: Primary | ICD-10-CM

## 2017-06-16 DIAGNOSIS — M62.81 WEAKNESS OF TRUNK MUSCULATURE: ICD-10-CM

## 2017-06-27 ENCOUNTER — CLINICAL SUPPORT (OUTPATIENT)
Dept: REHABILITATION | Facility: HOSPITAL | Age: 82
End: 2017-06-27
Attending: FAMILY MEDICINE
Payer: MEDICARE

## 2017-06-27 DIAGNOSIS — M25.551 HIP PAIN, BILATERAL: Primary | ICD-10-CM

## 2017-06-27 DIAGNOSIS — R29.898 WEAKNESS OF BOTH HIPS: ICD-10-CM

## 2017-06-27 DIAGNOSIS — M25.552 HIP PAIN, BILATERAL: Primary | ICD-10-CM

## 2017-06-27 DIAGNOSIS — M79.671 FOOT PAIN, BILATERAL: ICD-10-CM

## 2017-06-27 DIAGNOSIS — M62.81 WEAKNESS OF TRUNK MUSCULATURE: ICD-10-CM

## 2017-06-27 DIAGNOSIS — M79.672 FOOT PAIN, BILATERAL: ICD-10-CM

## 2017-06-27 PROCEDURE — 97110 THERAPEUTIC EXERCISES: CPT | Mod: PN | Performed by: PHYSICAL THERAPIST

## 2017-06-27 NOTE — PROGRESS NOTES
Name: Lucila REVELES [facility] Number: [id number]  Date of Treatment: 06/27/2017   Diagnosis:   Encounter Diagnoses   Name Primary?    Hip pain, bilateral Yes    Weakness of both hips     Weakness of trunk musculature     Foot pain, bilateral        Time in: 1445  Time Out: 1555  Total Treatment Time: 70 min  1:1 with PT 30 min  Visit 12 of 24     Subjective:    Lucila returns to continue physical therapy treatment.  She reports that the ankle pain persists.  She sees her physician tomorrow and will discuss this with her physician.  She was up on a ladder at home to clean the light shades and ceiling fans all of last week.  She states that the numbness is gone, but that she continues to have pain with prolonged walking and standing.  She is complaining of some pain in the shoulders and upper back secondary to her lamp cleaning activity.    Objective    Patient received individual therapy to increase strength, flexibility, posture and core stabilization with activities as follows:     Lucila received therapeutic exercises to develop strength, flexibility, posture and core stabilization for 40 minutes including:       - Supine bridges 2 x  10 reps with 5 sec hold  - Supine ankle resistance exercise with manual resistance from PT 20 reps   Ankle dorsiflexion   Ankle eversion    Ankle inversion   - Sidelying hip abd 2 x 10  - supine SLR 1 x 10 with 5 sec hold  - Supine trunk rotation with feet on therapy ball  - Standing hip abd with 20  - Standing hip ext 2 x 20 with verbal cuing for form  - Standing hip flexion 2 x 20  - Standing side glide of trunk on pelvis for posture correction x 3' - Mirror for visual feedback, tactile positioning from PT and verbal cuing  - Standing posture correction with overhead arm raise - mirror for visual feedback, verbal cuing from PT  - Upright Bike x 10'     Lucila received manual therapy techniques to the plantar surface of both feet as well as both lower extremities and trunk for 10 min  applied by PT    Written Home Exercises Reviewed: Pt demo good understanding of the education provided. Lucila demonstrated good return demonstration of activities. Patient is compliant with her home program and is doing well with her activities.    Assessment:     Lucila presents to the clinic for physical therapy intervention to address bilateral foot pain and LE weakness; gait abnormality and balance deficits.  Patient tolerated treatment session very well.  Patient is showing improvement in active ankle inversion, but continues to require persistent cuing to decrease compensation from the hip.  Patient with good tolerance with progression of standing exercises.   Patient with focus on maintaining correct posture alignment with standing exercises with tactile, verbal and visual cuing from PT and a mirror provided.  Patient is able to demonstrate self-correction of trunk alignment using a mirror for feedback.  Pt will continue to benefit from skilled PT intervention. Medical Necessity is demonstrated by:  Pain limits function of effected part for some activities, Unable to participate fully in daily activities, Requires skilled supervision to complete and progress HEP and Weakness.    Patient is making good progress towards established goals.    New/Revised goals: Established at initial evaluation  Focus on Therapeutic Outcomes (FOTO)  Impairment 69%    FOTO 5/5/17  Impairment: 58%    Mobility  Severity 60% - 80% CL  Goal:  CK 40% - 60%     Rehab Potiential: good     Short Term Goals (4 Weeks 6/26/17):   1. Patient will be able to ambulate throughout store with no greater than 5/10 level ankle pain - In progress  2. Patient will report maximum level of pain at rest of 2/10 - in progress  3. Patient will demonstrate hip musculature strength 3/5 - Met  4. Patient will be able to ascend stair without railing support - initiated     Long Term Goals/discharge goals (12 Weeks):   1. Patient will be able to  ambulate sufficient distance to participate in activities with grand-children and complete shopping tasks with pain no greater than 4/10  2. Patient will be able to complete household tasks with least restrictive assistive device  3. Patient will be independent with self care and management of her condition  4. Impairment rating for mobility     Plan:  Certification Period: 4/7/17 to 7/7/17  Treatment Plan: 2 times per week for 12 weeks: Manual Therapy, Neuromuscular Re-ed, Therapeutic Activites, Therapeutic Exercise and Other Progression of home program  Continue with established Plan of Care towards PT goals.     Yoko Ramachandran, PT

## 2017-06-28 ENCOUNTER — LAB VISIT (OUTPATIENT)
Dept: LAB | Facility: HOSPITAL | Age: 82
End: 2017-06-28
Attending: INTERNAL MEDICINE
Payer: MEDICARE

## 2017-06-28 ENCOUNTER — OFFICE VISIT (OUTPATIENT)
Dept: FAMILY MEDICINE | Facility: CLINIC | Age: 82
End: 2017-06-28
Payer: MEDICARE

## 2017-06-28 VITALS
OXYGEN SATURATION: 95 % | WEIGHT: 155 LBS | TEMPERATURE: 98 F | HEIGHT: 58 IN | DIASTOLIC BLOOD PRESSURE: 84 MMHG | SYSTOLIC BLOOD PRESSURE: 136 MMHG | HEART RATE: 102 BPM | RESPIRATION RATE: 18 BRPM | BODY MASS INDEX: 32.54 KG/M2

## 2017-06-28 DIAGNOSIS — R53.83 FATIGUE, UNSPECIFIED TYPE: ICD-10-CM

## 2017-06-28 DIAGNOSIS — M25.571 CHRONIC PAIN OF BOTH ANKLES: Primary | ICD-10-CM

## 2017-06-28 DIAGNOSIS — G89.29 CHRONIC PAIN OF BOTH ANKLES: Primary | ICD-10-CM

## 2017-06-28 DIAGNOSIS — M25.572 CHRONIC PAIN OF BOTH ANKLES: Primary | ICD-10-CM

## 2017-06-28 LAB
ANION GAP SERPL CALC-SCNC: 10 MMOL/L
BASOPHILS # BLD AUTO: 0.08 K/UL
BASOPHILS NFR BLD: 0.7 %
BUN SERPL-MCNC: 13 MG/DL
CALCIUM SERPL-MCNC: 9.9 MG/DL
CHLORIDE SERPL-SCNC: 108 MMOL/L
CO2 SERPL-SCNC: 24 MMOL/L
CREAT SERPL-MCNC: 0.9 MG/DL
DIFFERENTIAL METHOD: ABNORMAL
EOSINOPHIL # BLD AUTO: 0.2 K/UL
EOSINOPHIL NFR BLD: 1.6 %
ERYTHROCYTE [DISTWIDTH] IN BLOOD BY AUTOMATED COUNT: 13.9 %
EST. GFR  (AFRICAN AMERICAN): >60 ML/MIN/1.73 M^2
EST. GFR  (NON AFRICAN AMERICAN): >60 ML/MIN/1.73 M^2
GLUCOSE SERPL-MCNC: 115 MG/DL
HCT VFR BLD AUTO: 46.1 %
HGB BLD-MCNC: 16 G/DL
LYMPHOCYTES # BLD AUTO: 3.7 K/UL
LYMPHOCYTES NFR BLD: 33.1 %
MCH RBC QN AUTO: 31.6 PG
MCHC RBC AUTO-ENTMCNC: 34.7 %
MCV RBC AUTO: 91 FL
MONOCYTES # BLD AUTO: 0.9 K/UL
MONOCYTES NFR BLD: 8.4 %
NEUTROPHILS # BLD AUTO: 6.2 K/UL
NEUTROPHILS NFR BLD: 55.9 %
PLATELET # BLD AUTO: 287 K/UL
PMV BLD AUTO: 10.6 FL
POTASSIUM SERPL-SCNC: 3.6 MMOL/L
RBC # BLD AUTO: 5.07 M/UL
SODIUM SERPL-SCNC: 142 MMOL/L
TSH SERPL DL<=0.005 MIU/L-ACNC: 2.04 UIU/ML
WBC # BLD AUTO: 11.13 K/UL

## 2017-06-28 PROCEDURE — 85025 COMPLETE CBC W/AUTO DIFF WBC: CPT

## 2017-06-28 PROCEDURE — 83036 HEMOGLOBIN GLYCOSYLATED A1C: CPT

## 2017-06-28 PROCEDURE — 99214 OFFICE O/P EST MOD 30 MIN: CPT | Mod: S$GLB,,, | Performed by: INTERNAL MEDICINE

## 2017-06-28 PROCEDURE — 84443 ASSAY THYROID STIM HORMONE: CPT

## 2017-06-28 PROCEDURE — 99999 PR PBB SHADOW E&M-EST. PATIENT-LVL III: CPT | Mod: PBBFAC,,, | Performed by: INTERNAL MEDICINE

## 2017-06-28 PROCEDURE — 36415 COLL VENOUS BLD VENIPUNCTURE: CPT | Mod: PN

## 2017-06-28 PROCEDURE — 80048 BASIC METABOLIC PNL TOTAL CA: CPT

## 2017-06-28 PROCEDURE — 1159F MED LIST DOCD IN RCRD: CPT | Mod: S$GLB,,, | Performed by: INTERNAL MEDICINE

## 2017-06-28 PROCEDURE — 1125F AMNT PAIN NOTED PAIN PRSNT: CPT | Mod: S$GLB,,, | Performed by: INTERNAL MEDICINE

## 2017-06-28 PROCEDURE — 82607 VITAMIN B-12: CPT

## 2017-06-28 RX ORDER — TRAMADOL HYDROCHLORIDE 100 MG/1
100 TABLET, EXTENDED RELEASE ORAL DAILY
Qty: 30 TABLET | Refills: 0 | Status: SHIPPED | OUTPATIENT
Start: 2017-06-28 | End: 2017-07-08

## 2017-06-28 NOTE — PROGRESS NOTES
Subjective:       Patient ID: Lucila Myers is a 82 y.o. female.    Chief Complaint: Ankle Pain; Shortness of Breath; Fatigue; and Medication Management    She presents for follow up.  Discontinue Etodolac due to elevated bp which has resolved.  Tramadol only lasts about 4 hours.  Her pain is constant, 8/10.  She is less active as a result.  Therapy has helped with her mobility but not the pain in her ankles.  She also reports feeling fatigued and giving out of breath easily.  She did see Dr. Zhong and her heart is ok.       Review of Systems   Constitutional: Positive for fatigue.   Respiratory: Positive for shortness of breath.    Cardiovascular: Negative for chest pain.   Musculoskeletal: Positive for arthralgias. Negative for back pain and joint swelling.       Objective:      Physical Exam   Constitutional: She is oriented to person, place, and time. She appears well-developed and well-nourished. No distress.   HENT:   Head: Normocephalic and atraumatic.   Right Ear: External ear normal.   Left Ear: External ear normal.   Eyes: Conjunctivae are normal. No scleral icterus.   Cardiovascular: Normal rate, regular rhythm and normal heart sounds.  Exam reveals no gallop and no friction rub.    No murmur heard.  Pulmonary/Chest: Effort normal and breath sounds normal. No respiratory distress. She has no wheezes. She has no rales.   Neurological: She is alert and oriented to person, place, and time. No cranial nerve deficit.   Skin: Skin is warm and dry.   Psychiatric: She has a normal mood and affect.   Vitals reviewed.      Assessment:       1. Chronic pain of both ankles    2. Fatigue, unspecified type        Plan:       Lucila was seen today for ankle pain, shortness of breath, fatigue and medication management.    Diagnoses and all orders for this visit:    Chronic pain of both ankles - c/o continued pain.  No improvement with therapy.  She has declined referral at this time.  Will complete PA for tramadol ER  -      tramadol (ULTRAM-ER) 100 MG Tb24; Take 1 tablet (100 mg total) by mouth once daily.    Fatigue, unspecified type - Likely deconditioning.  Recent eval by cardiology.  Labs as below.  Continue PT  -     TSH; Future  -     Basic metabolic panel; Future  -     CBC auto differential; Future  -     Vitamin B12; Future  -     Hemoglobin A1c; Future       f/u 3 months

## 2017-06-29 ENCOUNTER — TELEPHONE (OUTPATIENT)
Dept: FAMILY MEDICINE | Facility: CLINIC | Age: 82
End: 2017-06-29

## 2017-06-29 LAB
ESTIMATED AVG GLUCOSE: 100 MG/DL
HBA1C MFR BLD HPLC: 5.1 %
VIT B12 SERPL-MCNC: 287 PG/ML

## 2017-07-06 ENCOUNTER — TELEPHONE (OUTPATIENT)
Dept: FAMILY MEDICINE | Facility: CLINIC | Age: 82
End: 2017-07-06

## 2017-07-06 NOTE — TELEPHONE ENCOUNTER
Received fax from pharmacy stating that Tramadol HCL   mg is not covered by patient's insurance and that a prior auth will be require. Please advise.

## 2017-07-10 ENCOUNTER — TELEPHONE (OUTPATIENT)
Dept: FAMILY MEDICINE | Facility: CLINIC | Age: 82
End: 2017-07-10

## 2017-07-10 NOTE — TELEPHONE ENCOUNTER
----- Message from Constanza Galindo sent at 7/7/2017  3:13 PM CDT -----  Contact: [phone]  Pt states the new prescription she was prescribed is not covered by her insurance and its too expensive pt is requesting a increase on the tramadol instead Thanks !

## 2017-07-13 ENCOUNTER — TELEPHONE (OUTPATIENT)
Dept: FAMILY MEDICINE | Facility: CLINIC | Age: 82
End: 2017-07-13

## 2017-07-13 NOTE — TELEPHONE ENCOUNTER
Prior auth form is on the MD desk to be signed and faxed.  Pt states she would like to go back to the go back to the 50 mg since it has been filled already but before she pick it up she would like to have it increased to 4 pills a day instead of 3. She would like to try and see if that helps.

## 2017-07-18 ENCOUNTER — TELEPHONE (OUTPATIENT)
Dept: REHABILITATION | Facility: HOSPITAL | Age: 82
End: 2017-07-18

## 2017-08-03 ENCOUNTER — TELEPHONE (OUTPATIENT)
Dept: PODIATRY | Facility: CLINIC | Age: 82
End: 2017-08-03

## 2017-08-03 NOTE — TELEPHONE ENCOUNTER
----- Message from Constanza Galindo sent at 8/3/2017  1:13 PM CDT -----  Contact: [phone]  Pt is requesting a call back from the nurse in regards to her foot and ankle Please call pt at your earliest convenience.  Thanks !

## 2017-08-17 DIAGNOSIS — M25.571 CHRONIC PAIN OF BOTH ANKLES: ICD-10-CM

## 2017-08-17 DIAGNOSIS — G89.29 CHRONIC PAIN OF BOTH ANKLES: ICD-10-CM

## 2017-08-17 DIAGNOSIS — G89.29 CHRONIC ANKLE PAIN, UNSPECIFIED LATERALITY: ICD-10-CM

## 2017-08-17 DIAGNOSIS — M25.579 CHRONIC ANKLE PAIN, UNSPECIFIED LATERALITY: ICD-10-CM

## 2017-08-17 DIAGNOSIS — M25.572 CHRONIC PAIN OF BOTH ANKLES: ICD-10-CM

## 2017-08-17 RX ORDER — TRAMADOL HYDROCHLORIDE 50 MG/1
50 TABLET ORAL EVERY 6 HOURS PRN
Qty: 120 TABLET | Refills: 1 | Status: SHIPPED | OUTPATIENT
Start: 2017-08-17 | End: 2017-10-18 | Stop reason: SDUPTHER

## 2017-08-17 RX ORDER — TRAMADOL HYDROCHLORIDE 100 MG/1
100 TABLET, EXTENDED RELEASE ORAL DAILY
Qty: 30 TABLET | Refills: 0 | Status: CANCELLED | OUTPATIENT
Start: 2017-08-17 | End: 2017-08-27

## 2017-08-17 NOTE — TELEPHONE ENCOUNTER
----- Message from Kimberly Ghosh sent at 8/17/2017 11:24 AM CDT -----  Contact: SELF  REFILL: tramadol (ULTRAM) 50 mg tablet    INSURANCE WILL ONLY PAY FOR 50MG

## 2017-10-18 ENCOUNTER — OFFICE VISIT (OUTPATIENT)
Dept: FAMILY MEDICINE | Facility: CLINIC | Age: 82
End: 2017-10-18
Payer: MEDICARE

## 2017-10-18 VITALS
WEIGHT: 156.5 LBS | BODY MASS INDEX: 32.85 KG/M2 | HEIGHT: 58 IN | DIASTOLIC BLOOD PRESSURE: 76 MMHG | TEMPERATURE: 98 F | SYSTOLIC BLOOD PRESSURE: 128 MMHG | OXYGEN SATURATION: 96 % | HEART RATE: 86 BPM | RESPIRATION RATE: 16 BRPM

## 2017-10-18 DIAGNOSIS — B37.2 INTERTRIGINOUS CANDIDIASIS: ICD-10-CM

## 2017-10-18 DIAGNOSIS — M25.579 CHRONIC ANKLE PAIN, UNSPECIFIED LATERALITY: ICD-10-CM

## 2017-10-18 DIAGNOSIS — Z23 NEED FOR PROPHYLACTIC VACCINATION AND INOCULATION AGAINST INFLUENZA: Primary | ICD-10-CM

## 2017-10-18 DIAGNOSIS — G89.29 CHRONIC ANKLE PAIN, UNSPECIFIED LATERALITY: ICD-10-CM

## 2017-10-18 PROCEDURE — 90662 IIV NO PRSV INCREASED AG IM: CPT | Mod: S$GLB,,, | Performed by: INTERNAL MEDICINE

## 2017-10-18 PROCEDURE — G0008 ADMIN INFLUENZA VIRUS VAC: HCPCS | Mod: S$GLB,,, | Performed by: INTERNAL MEDICINE

## 2017-10-18 PROCEDURE — 99213 OFFICE O/P EST LOW 20 MIN: CPT | Mod: S$GLB,,, | Performed by: INTERNAL MEDICINE

## 2017-10-18 PROCEDURE — 99999 PR PBB SHADOW E&M-EST. PATIENT-LVL III: CPT | Mod: PBBFAC,,, | Performed by: INTERNAL MEDICINE

## 2017-10-18 RX ORDER — DULOXETIN HYDROCHLORIDE 30 MG/1
30 CAPSULE, DELAYED RELEASE ORAL DAILY
Qty: 30 CAPSULE | Refills: 2 | Status: SHIPPED | OUTPATIENT
Start: 2017-10-18 | End: 2018-01-31

## 2017-10-18 RX ORDER — CLOTRIMAZOLE AND BETAMETHASONE DIPROPIONATE 10; .64 MG/G; MG/G
CREAM TOPICAL
Qty: 45 G | Refills: 3 | Status: SHIPPED | OUTPATIENT
Start: 2017-10-18 | End: 2018-01-31

## 2017-10-18 RX ORDER — TRAMADOL HYDROCHLORIDE 50 MG/1
50 TABLET ORAL EVERY 8 HOURS PRN
Qty: 90 TABLET | Refills: 1 | Status: SHIPPED | OUTPATIENT
Start: 2017-10-18 | End: 2018-01-04 | Stop reason: SDUPTHER

## 2017-10-18 NOTE — PROGRESS NOTES
Subjective:       Patient ID: Lucila Myers is a 82 y.o. female.    Chief Complaint: Arthritis    She presents today with complaints of worsening pain in her bilateral ankles which is now radiating to her calves.  She also knows nodules on her fingers which are worsening.  Her legs feel weak.  She did have improvement with physical therapy.  She also complains of fatigue again today.  She has no energy and decreased appetite lately.  She was having some shortness of breath and did see cardiology with a reportedly normal workup.  She reports that she decrease tramadol back to 3 times daily because she was having drowsiness taken 4 times daily.      Review of Systems   Constitutional: Positive for fatigue.   Musculoskeletal: Positive for arthralgias and arthritis. Negative for joint swelling.   Neurological: Positive for weakness. Negative for numbness.       Objective:      Physical Exam   Constitutional: She is oriented to person, place, and time. She appears well-developed and well-nourished. No distress.   HENT:   Head: Normocephalic and atraumatic.   Eyes: Conjunctivae are normal. No scleral icterus.   Neurological: She is alert and oriented to person, place, and time.   Psychiatric: She has a normal mood and affect.   Vitals reviewed.      Assessment:       1. Need for prophylactic vaccination and inoculation against influenza    2. Chronic ankle pain, unspecified laterality        Plan:       Lucila was seen today for arthritis.    Diagnoses and all orders for this visit:    Need for prophylactic vaccination and inoculation against influenza  -     Flu Vaccine - High Dose (PF) (65+)    Chronic ankle pain, unspecified laterality - she reports worsening symptoms.  Decreased tramadol to tid do to drowsiness.  She has declined referral to PM&R.  Will add cymbalta.  Reassess next visit.  She will call with any concerns with the new medication  -     tramadol (ULTRAM) 50 mg tablet; Take 1 tablet (50 mg total) by  mouth every 8 (eight) hours as needed for Pain.  -     duloxetine (CYMBALTA) 30 MG capsule; Take 1 capsule (30 mg total) by mouth once daily.       f/u 3 months and prn

## 2017-12-26 ENCOUNTER — HOSPITAL ENCOUNTER (EMERGENCY)
Facility: HOSPITAL | Age: 82
Discharge: HOME | End: 2017-12-26
Attending: EMERGENCY MEDICINE
Payer: MEDICARE

## 2017-12-26 VITALS
TEMPERATURE: 98 F | HEIGHT: 59 IN | RESPIRATION RATE: 20 BRPM | BODY MASS INDEX: 30.24 KG/M2 | WEIGHT: 150 LBS | DIASTOLIC BLOOD PRESSURE: 71 MMHG | SYSTOLIC BLOOD PRESSURE: 118 MMHG | HEART RATE: 111 BPM | OXYGEN SATURATION: 96 %

## 2017-12-26 DIAGNOSIS — R05.9 COUGH: ICD-10-CM

## 2017-12-26 DIAGNOSIS — J06.9 VIRAL URI WITH COUGH: Primary | ICD-10-CM

## 2017-12-26 PROCEDURE — 99283 EMERGENCY DEPT VISIT LOW MDM: CPT

## 2017-12-26 RX ORDER — PROMETHAZINE HYDROCHLORIDE AND DEXTROMETHORPHAN HYDROBROMIDE 6.25; 15 MG/5ML; MG/5ML
5 SYRUP ORAL EVERY 4 HOURS PRN
Qty: 120 ML | Refills: 0 | Status: SHIPPED | OUTPATIENT
Start: 2017-12-26 | End: 2018-01-05

## 2017-12-26 RX ORDER — BENZONATATE 100 MG/1
100 CAPSULE ORAL 3 TIMES DAILY PRN
Qty: 20 CAPSULE | Refills: 0 | Status: SHIPPED | OUTPATIENT
Start: 2017-12-26 | End: 2018-01-05

## 2017-12-26 RX ORDER — DOXYCYCLINE HYCLATE 100 MG
100 TABLET ORAL 2 TIMES DAILY
Qty: 20 TABLET | Refills: 0 | Status: SHIPPED | OUTPATIENT
Start: 2017-12-26 | End: 2018-01-31

## 2018-01-04 DIAGNOSIS — G89.29 CHRONIC ANKLE PAIN, UNSPECIFIED LATERALITY: ICD-10-CM

## 2018-01-04 DIAGNOSIS — M25.579 CHRONIC ANKLE PAIN, UNSPECIFIED LATERALITY: ICD-10-CM

## 2018-01-05 RX ORDER — TRAMADOL HYDROCHLORIDE 50 MG/1
50 TABLET ORAL EVERY 8 HOURS PRN
Qty: 90 TABLET | Refills: 0 | Status: SHIPPED | OUTPATIENT
Start: 2018-01-05 | End: 2018-02-15 | Stop reason: SDUPTHER

## 2018-01-05 NOTE — TELEPHONE ENCOUNTER
Patient was notified that script is ready for . Patient said that she is waiting for new provider to come on board to establish care with her.

## 2018-01-31 ENCOUNTER — OFFICE VISIT (OUTPATIENT)
Dept: FAMILY MEDICINE | Facility: CLINIC | Age: 83
End: 2018-01-31
Payer: MEDICARE

## 2018-01-31 VITALS
HEART RATE: 92 BPM | DIASTOLIC BLOOD PRESSURE: 74 MMHG | TEMPERATURE: 98 F | HEIGHT: 59 IN | BODY MASS INDEX: 30.58 KG/M2 | OXYGEN SATURATION: 95 % | WEIGHT: 151.69 LBS | SYSTOLIC BLOOD PRESSURE: 136 MMHG

## 2018-01-31 DIAGNOSIS — M51.37 DEGENERATIVE DISC DISEASE AT L5-S1 LEVEL: ICD-10-CM

## 2018-01-31 DIAGNOSIS — M12.9 ARTHRITIS, MULTIPLE JOINT INVOLVEMENT: ICD-10-CM

## 2018-01-31 DIAGNOSIS — R09.82 POSTNASAL DRIP: Primary | ICD-10-CM

## 2018-01-31 PROBLEM — M51.379 DEGENERATIVE DISC DISEASE AT L5-S1 LEVEL: Status: ACTIVE | Noted: 2018-01-31

## 2018-01-31 PROCEDURE — 99213 OFFICE O/P EST LOW 20 MIN: CPT | Mod: S$GLB,,, | Performed by: FAMILY MEDICINE

## 2018-01-31 PROCEDURE — 1125F AMNT PAIN NOTED PAIN PRSNT: CPT | Mod: S$GLB,,, | Performed by: FAMILY MEDICINE

## 2018-01-31 PROCEDURE — 1159F MED LIST DOCD IN RCRD: CPT | Mod: S$GLB,,, | Performed by: FAMILY MEDICINE

## 2018-01-31 PROCEDURE — 3008F BODY MASS INDEX DOCD: CPT | Mod: S$GLB,,, | Performed by: FAMILY MEDICINE

## 2018-01-31 PROCEDURE — 99999 PR PBB SHADOW E&M-EST. PATIENT-LVL III: CPT | Mod: PBBFAC,,, | Performed by: FAMILY MEDICINE

## 2018-01-31 NOTE — PROGRESS NOTES
"Chief Complaint   Patient presents with    Establish Care       HPI    Lucila Meyrs is 82 y.o. female. The primary encounter diagnosis was Postnasal drip. Diagnoses of Arthritis, multiple joint involvement and Degenerative disc disease at L5-S1 level were also pertinent to this visit.    82-year-old female with arthritis and chronic low back pain comes to clinic to establish care.  She denies any acute issues or urgent concerns.  However she does mention assistance of upper respiratory symptoms.  Patient reports that she was seen in ED in December.  She reports being given antibiotics and cough medication.  She reports quick resolution of her symptoms with this treatment. Today she reports postnasal drip.      Review of Systems   Constitutional: Negative for chills, diaphoresis, fatigue and fever.   HENT: Positive for postnasal drip. Negative for congestion, rhinorrhea, sinus pain, sinus pressure and sore throat.    Musculoskeletal: Positive for arthralgias, gait problem, joint swelling and myalgias.           Current Outpatient Prescriptions:     omeprazole (PRILOSEC) 20 MG capsule, Take 1 capsule (20 mg total) by mouth once daily., Disp: 90 capsule, Rfl: 2    traMADol (ULTRAM) 50 mg tablet, Take 1 tablet (50 mg total) by mouth every 8 (eight) hours as needed for Pain., Disp: 90 tablet, Rfl: 0      Blood pressure 136/74, pulse 92, temperature 97.9 °F (36.6 °C), temperature source Oral, height 4' 11" (1.499 m), weight 68.8 kg (151 lb 10.8 oz), SpO2 95 %.    Physical Exam   Constitutional: Vital signs are normal. She appears well-developed.   HENT:   Mouth/Throat: Normal dentition.   Neck: Trachea normal. No thyromegaly present.   Cardiovascular: Normal rate, regular rhythm and intact distal pulses.    No murmur heard.  Pulmonary/Chest: Effort normal. She has no decreased breath sounds. She has no wheezes. She exhibits no deformity.   Musculoskeletal:   Antalgic gait with the use of assistive device for " ambulation.   Neurological: She is not disoriented.   Skin: Skin is intact. Capillary refill takes less than 2 seconds.   Psychiatric: Her speech is normal and behavior is normal. Her mood appears not anxious. She does not exhibit a depressed mood.       No visits with results within 3 Month(s) from this visit.   Latest known visit with results is:   Lab Visit on 06/28/2017   Component Date Value Ref Range Status    TSH 06/28/2017 2.040  0.400 - 4.000 uIU/mL Final    Sodium 06/28/2017 142  136 - 145 mmol/L Final    Potassium 06/28/2017 3.6  3.5 - 5.1 mmol/L Final    Chloride 06/28/2017 108  95 - 110 mmol/L Final    CO2 06/28/2017 24  23 - 29 mmol/L Final    Glucose 06/28/2017 115* 70 - 110 mg/dL Final    BUN, Bld 06/28/2017 13  8 - 23 mg/dL Final    Creatinine 06/28/2017 0.9  0.5 - 1.4 mg/dL Final    Calcium 06/28/2017 9.9  8.7 - 10.5 mg/dL Final    Anion Gap 06/28/2017 10  8 - 16 mmol/L Final    eGFR if  06/28/2017 >60  >60 mL/min/1.73 m^2 Final    eGFR if non African American 06/28/2017 >60  >60 mL/min/1.73 m^2 Final    WBC 06/28/2017 11.13  3.90 - 12.70 K/uL Final    RBC 06/28/2017 5.07  4.00 - 5.40 M/uL Final    Hemoglobin 06/28/2017 16.0  12.0 - 16.0 g/dL Final    Hematocrit 06/28/2017 46.1  37.0 - 48.5 % Final    MCV 06/28/2017 91  82 - 98 fL Final    MCH 06/28/2017 31.6* 27.0 - 31.0 pg Final    MCHC 06/28/2017 34.7  32.0 - 36.0 % Final    RDW 06/28/2017 13.9  11.5 - 14.5 % Final    Platelets 06/28/2017 287  150 - 350 K/uL Final    MPV 06/28/2017 10.6  9.2 - 12.9 fL Final    Gran # (ANC) 06/28/2017 6.2  1.8 - 7.7 K/uL Final    Lymph # 06/28/2017 3.7  1.0 - 4.8 K/uL Final    Mono # 06/28/2017 0.9  0.3 - 1.0 K/uL Final    Eos # 06/28/2017 0.2  0.0 - 0.5 K/uL Final    Baso # 06/28/2017 0.08  0.00 - 0.20 K/uL Final    Gran% 06/28/2017 55.9  38.0 - 73.0 % Final    Lymph% 06/28/2017 33.1  18.0 - 48.0 % Final    Mono% 06/28/2017 8.4  4.0 - 15.0 % Final    Eosinophil%  06/28/2017 1.6  0.0 - 8.0 % Final    Basophil% 06/28/2017 0.7  0.0 - 1.9 % Final    Differential Method 06/28/2017 Automated   Final    Vitamin B-12 06/28/2017 287  210 - 950 pg/mL Final    Hemoglobin A1C 06/28/2017 5.1  4.0 - 5.6 % Final    Estimated Avg Glucose 06/28/2017 100  68 - 131 mg/dL Final   ]    Assessment:    1. Postnasal drip    2. Arthritis, multiple joint involvement    3. Degenerative disc disease at L5-S1 level          Lucila was seen today for establish care.    Diagnoses and all orders for this visit:    Postnasal drip   -New problem.  Minimal postnasal drip on exam.  Patient instructed to use oral antihistamine and Flonase.    Arthritis, multiple joint involvement   -Stable.  Continue use of tramadol as needed for pain.    Degenerative disc disease at L5-S1 level   -Stable.  Continue use her tramadol as needed for severe pain.  Patient encouraged to continue physical activity to maintain mobility and function.        FOLLOW UP: Follow-up in about 6 months (around 7/31/2018) for Follow up.

## 2018-02-15 DIAGNOSIS — G89.29 CHRONIC ANKLE PAIN, UNSPECIFIED LATERALITY: ICD-10-CM

## 2018-02-15 DIAGNOSIS — M25.579 CHRONIC ANKLE PAIN, UNSPECIFIED LATERALITY: ICD-10-CM

## 2018-02-15 RX ORDER — TRAMADOL HYDROCHLORIDE 50 MG/1
50 TABLET ORAL EVERY 12 HOURS PRN
Qty: 90 TABLET | Refills: 0 | Status: SHIPPED | OUTPATIENT
Start: 2018-02-15 | End: 2018-03-13 | Stop reason: SDUPTHER

## 2018-02-15 NOTE — TELEPHONE ENCOUNTER
----- Message from Delmi Meier sent at 2/15/2018  2:47 PM CST -----  Contact: [employer]  REFILL: traMADol (ULTRAM) 50 mg tablet    #Pt swictched [facility] [place] [facility].

## 2018-02-19 ENCOUNTER — TELEPHONE (OUTPATIENT)
Dept: FAMILY MEDICINE | Facility: CLINIC | Age: 83
End: 2018-02-19

## 2018-02-19 NOTE — TELEPHONE ENCOUNTER
----- Message from Emmanuel Sofia sent at 2/19/2018 10:21 AM CST -----  Contact: [phone]/PT  Calling TO speak with nurse regarding dosage of script incorrect

## 2018-02-19 NOTE — TELEPHONE ENCOUNTER
Patient state she was given her script for tramadol but the directions should be for her to take it every 8 hours instead of every 12 hours. She states she was given the correct amount,but just wanted to let us know that she been taking her tramadol every 8 hours for years.

## 2018-03-13 DIAGNOSIS — G89.29 CHRONIC ANKLE PAIN, UNSPECIFIED LATERALITY: ICD-10-CM

## 2018-03-13 DIAGNOSIS — M25.579 CHRONIC ANKLE PAIN, UNSPECIFIED LATERALITY: ICD-10-CM

## 2018-03-13 RX ORDER — TRAMADOL HYDROCHLORIDE 50 MG/1
50 TABLET ORAL EVERY 8 HOURS PRN
Qty: 90 TABLET | Refills: 0 | Status: SHIPPED | OUTPATIENT
Start: 2018-03-13 | End: 2018-03-15 | Stop reason: SDUPTHER

## 2018-03-13 NOTE — TELEPHONE ENCOUNTER
----- Message from Sophie Powell sent at 3/13/2018 10:36 AM CDT -----  Contact: self  Please call pt to discuss getting her tramadol refill at [phone]. Thanks

## 2018-03-14 ENCOUNTER — OFFICE VISIT (OUTPATIENT)
Dept: FAMILY MEDICINE | Facility: CLINIC | Age: 83
End: 2018-03-14
Payer: MEDICARE

## 2018-03-14 VITALS
RESPIRATION RATE: 24 BRPM | OXYGEN SATURATION: 96 % | WEIGHT: 147.69 LBS | TEMPERATURE: 98 F | SYSTOLIC BLOOD PRESSURE: 130 MMHG | DIASTOLIC BLOOD PRESSURE: 70 MMHG | HEART RATE: 94 BPM | BODY MASS INDEX: 29.83 KG/M2

## 2018-03-14 DIAGNOSIS — G89.29 CHRONIC ANKLE PAIN, UNSPECIFIED LATERALITY: ICD-10-CM

## 2018-03-14 DIAGNOSIS — J06.9 VIRAL URI WITH COUGH: Primary | ICD-10-CM

## 2018-03-14 DIAGNOSIS — M25.579 CHRONIC ANKLE PAIN, UNSPECIFIED LATERALITY: ICD-10-CM

## 2018-03-14 PROCEDURE — 99214 OFFICE O/P EST MOD 30 MIN: CPT | Mod: S$GLB,,, | Performed by: NURSE PRACTITIONER

## 2018-03-14 PROCEDURE — 99999 PR PBB SHADOW E&M-EST. PATIENT-LVL III: CPT | Mod: PBBFAC,,, | Performed by: NURSE PRACTITIONER

## 2018-03-14 RX ORDER — BENZONATATE 200 MG/1
200 CAPSULE ORAL 3 TIMES DAILY PRN
Qty: 30 CAPSULE | Refills: 0 | Status: SHIPPED | OUTPATIENT
Start: 2018-03-14 | End: 2018-03-24

## 2018-03-14 RX ORDER — TRAMADOL HYDROCHLORIDE 50 MG/1
50 TABLET ORAL EVERY 8 HOURS PRN
Qty: 90 TABLET | Refills: 0 | Status: CANCELLED | OUTPATIENT
Start: 2018-03-14

## 2018-03-14 RX ORDER — PROMETHAZINE HYDROCHLORIDE AND DEXTROMETHORPHAN HYDROBROMIDE 6.25; 15 MG/5ML; MG/5ML
5 SYRUP ORAL
Qty: 240 ML | Refills: 0 | Status: SHIPPED | OUTPATIENT
Start: 2018-03-14 | End: 2018-03-21

## 2018-03-14 RX ORDER — LORATADINE 10 MG/1
10 TABLET ORAL DAILY
Qty: 30 TABLET | Refills: 0 | Status: SHIPPED | OUTPATIENT
Start: 2018-03-14 | End: 2018-04-20

## 2018-03-14 RX ORDER — FLUTICASONE PROPIONATE 50 MCG
1 SPRAY, SUSPENSION (ML) NASAL DAILY
Qty: 1 BOTTLE | Refills: 0 | Status: CANCELLED | OUTPATIENT
Start: 2018-03-14

## 2018-03-14 NOTE — TELEPHONE ENCOUNTER
Spoke to pt, and she has already picked up all meds from pharmacy except forTramadol. She can't  Tramadol until 3/16/18.

## 2018-03-14 NOTE — PATIENT INSTRUCTIONS
Follow up with primary care provider if not improved  Go to ER for new, worse or concerning symptoms  Drink plenty of fluids  Tylenol or ibuprofen as needed for fever or pain    Viral Upper Respiratory Illness (Adult)  You have a viral upper respiratory illness (URI), which is another term for the common cold. This illness is contagious during the first few days. It is spread through the air by coughing and sneezing. It may also be spread by direct contact (touching the sick person and then touching your own eyes, nose, or mouth). Frequent handwashing will decrease risk of spread. Most viral illnesses go away within 7 to 10 days with rest and simple home remedies. Sometimes the illness may last for several weeks. Antibiotics will not kill a virus, and they are generally not prescribed for this condition.    Home care  · If symptoms are severe, rest at home for the first 2 to 3 days. When you resume activity, don't let yourself get too tired.  · Avoid being exposed to cigarette smoke (yours or others).  · You may use acetaminophen or ibuprofen to control pain and fever, unless another medicine was prescribed. (Note: If you have chronic liver or kidney disease, have ever had a stomach ulcer or gastrointestinal bleeding, or are taking blood-thinning medicines, talk with your healthcare provider before using these medicines.) Aspirin should never be given to anyone under 18 years of age who is ill with a viral infection or fever. It may cause severe liver or brain damage.  · Your appetite may be poor, so a light diet is fine. Avoid dehydration by drinking 6 to 8 glasses of fluids per day (water, soft drinks, juices, tea, or soup). Extra fluids will help loosen secretions in the nose and lungs.  · Over-the-counter cold medicines will not shorten the length of time youre sick, but they may be helpful for the following symptoms: cough, sore throat, and nasal and sinus congestion. (Note: Do not use decongestants if you  have high blood pressure.)  Follow-up care  Follow up with your healthcare provider, or as advised.  When to seek medical advice  Call your healthcare provider right away if any of these occur:  · Cough with lots of colored sputum (mucus)  · Severe headache; face, neck, or ear pain  · Difficulty swallowing due to throat pain  · Fever of 100.4°F (38°C)  Call 911, or get immediate medical care  Call emergency services right away if any of these occur:  · Chest pain, shortness of breath, wheezing, or difficulty breathing  · Coughing up blood  · Inability to swallow due to throat pain  Date Last Reviewed: 9/13/2015  © 3422-6530 [employer]. [address] [place] [address], [place] [address]. All rights reserved. This information is not intended as a substitute for professional medical care. Always follow your healthcare professional's instructions.

## 2018-03-14 NOTE — TELEPHONE ENCOUNTER
----- Message from Delmis Neo sent at 3/14/2018  2:14 PM CDT -----  Contact: Bri with [facility]   Please transfer meds sent yesterday to -- [facility] in [place] on [address]    [phone].

## 2018-03-14 NOTE — PROGRESS NOTES
Upper Respiratory Infection  Patient complains of a 4day history of some URI complaints. Associated symptoms include NP cough, rhinorrhea, PND.  She has attempted flonase OTC.  Sick contacts include none specific.  She has had a flu shot this season.    Subjective:       Patient ID: Lucila Myers is a 82 y.o. female.      Review of Systems   Constitutional: Negative for fever.   HENT: Positive for postnasal drip.    Respiratory: Positive for cough.    Cardiovascular: Negative.        Objective:      Physical Exam   Constitutional: She is oriented to person, place, and time. She appears well-developed and well-nourished. She does not appear ill. No distress.   HENT:   Head: Normocephalic and atraumatic.   Right Ear: Tympanic membrane normal.   Left Ear: Tympanic membrane normal.   Nose: Mucosal edema and rhinorrhea present. Right sinus exhibits no maxillary sinus tenderness and no frontal sinus tenderness. Left sinus exhibits no maxillary sinus tenderness and no frontal sinus tenderness.   Mouth/Throat: Uvula is midline and mucous membranes are normal. Posterior oropharyngeal erythema present. No oropharyngeal exudate or posterior oropharyngeal edema.   Cardiovascular: Normal rate, regular rhythm and normal heart sounds.  Exam reveals no friction rub.    No murmur heard.  Pulmonary/Chest: Effort normal and breath sounds normal. No respiratory distress. She has no decreased breath sounds. She has no wheezes. She has no rhonchi. She has no rales.   Coughing during exam   Musculoskeletal: Normal range of motion.   Lymphadenopathy:        Head (right side): No submental, no submandibular and no tonsillar adenopathy present.        Head (left side): No submental, no submandibular and no tonsillar adenopathy present.     She has no cervical adenopathy.   Neurological: She is alert and oriented to person, place, and time.   Skin: Skin is dry.   Psychiatric: She has a normal mood and affect. Her behavior is normal.   Vitals  reviewed.      Assessment:       1. Viral URI with cough    2. Chronic ankle pain, unspecified laterality        Plan:       Viral URI with cough  -     loratadine (CLARITIN) 10 mg tablet; Take 1 tablet (10 mg total) by mouth once daily.  Dispense: 30 tablet; Refill: 0  -     benzonatate (TESSALON) 200 MG capsule; Take 1 capsule (200 mg total) by mouth 3 (three) times daily as needed for Cough.  Dispense: 30 capsule; Refill: 0  -     promethazine-dextromethorphan (PROMETHAZINE-DM) 6.25-15 mg/5 mL Syrp; Take 5 mLs by mouth every 4 to 6 hours as needed.  Dispense: 240 mL; Refill: 0    Most likely viral, she was seen in January with similar symptoms, given flonase and OTC antihistamine.    Follow up with primary care provider if not improved  Go to ER for new, worse or concerning symptoms  Drink plenty of fluids  Tylenol or ibuprofen as needed for fever or pain

## 2018-03-14 NOTE — TELEPHONE ENCOUNTER
The pt. States that [facility] dispensed the wrong medication, She came in to see Teodora Moran today and jayna like to have the tramadol called into her [facility] on file.

## 2018-03-15 ENCOUNTER — TELEPHONE (OUTPATIENT)
Dept: FAMILY MEDICINE | Facility: CLINIC | Age: 83
End: 2018-03-15

## 2018-03-15 DIAGNOSIS — G89.29 CHRONIC ANKLE PAIN, UNSPECIFIED LATERALITY: ICD-10-CM

## 2018-03-15 DIAGNOSIS — M25.579 CHRONIC ANKLE PAIN, UNSPECIFIED LATERALITY: ICD-10-CM

## 2018-03-15 RX ORDER — TRAMADOL HYDROCHLORIDE 50 MG/1
50 TABLET ORAL EVERY 8 HOURS PRN
Qty: 90 TABLET | Refills: 0 | OUTPATIENT
Start: 2018-03-15

## 2018-03-15 RX ORDER — TRAMADOL HYDROCHLORIDE 50 MG/1
50 TABLET ORAL EVERY 8 HOURS PRN
Qty: 90 TABLET | Refills: 0 | Status: SHIPPED | OUTPATIENT
Start: 2018-03-15 | End: 2018-04-17 | Stop reason: SDUPTHER

## 2018-03-15 NOTE — TELEPHONE ENCOUNTER
----- Message from Delmis vinayrigo sent at 3/14/2018  2:11 PM CDT -----  Contact: Bri with [facility]   Please transfer meds sent today to -- [facility] in [place] on [address]  .   [phone].

## 2018-04-17 DIAGNOSIS — G89.29 CHRONIC ANKLE PAIN, UNSPECIFIED LATERALITY: ICD-10-CM

## 2018-04-17 DIAGNOSIS — M25.579 CHRONIC ANKLE PAIN, UNSPECIFIED LATERALITY: ICD-10-CM

## 2018-04-17 RX ORDER — TRAMADOL HYDROCHLORIDE 50 MG/1
TABLET ORAL
Qty: 90 TABLET | Refills: 0 | Status: SHIPPED | OUTPATIENT
Start: 2018-04-17 | End: 2018-05-20 | Stop reason: SDUPTHER

## 2018-04-20 ENCOUNTER — OFFICE VISIT (OUTPATIENT)
Dept: FAMILY MEDICINE | Facility: CLINIC | Age: 83
End: 2018-04-20
Payer: MEDICARE

## 2018-04-20 VITALS
WEIGHT: 147.94 LBS | OXYGEN SATURATION: 96 % | SYSTOLIC BLOOD PRESSURE: 120 MMHG | DIASTOLIC BLOOD PRESSURE: 72 MMHG | HEIGHT: 59 IN | BODY MASS INDEX: 29.82 KG/M2 | HEART RATE: 60 BPM | TEMPERATURE: 98 F | RESPIRATION RATE: 17 BRPM

## 2018-04-20 DIAGNOSIS — J20.9 ACUTE BRONCHITIS, UNSPECIFIED ORGANISM: Primary | ICD-10-CM

## 2018-04-20 PROCEDURE — 94640 AIRWAY INHALATION TREATMENT: CPT | Mod: S$GLB,,, | Performed by: FAMILY MEDICINE

## 2018-04-20 PROCEDURE — 99999 PR PBB SHADOW E&M-EST. PATIENT-LVL III: CPT | Mod: PBBFAC,,, | Performed by: FAMILY MEDICINE

## 2018-04-20 PROCEDURE — 99214 OFFICE O/P EST MOD 30 MIN: CPT | Mod: 25,S$GLB,, | Performed by: FAMILY MEDICINE

## 2018-04-20 PROCEDURE — 96372 THER/PROPH/DIAG INJ SC/IM: CPT | Mod: 59,S$GLB,, | Performed by: FAMILY MEDICINE

## 2018-04-20 RX ORDER — PROMETHAZINE HYDROCHLORIDE AND DEXTROMETHORPHAN HYDROBROMIDE 6.25; 15 MG/5ML; MG/5ML
5 SYRUP ORAL 4 TIMES DAILY PRN
Qty: 240 ML | Refills: 0 | Status: SHIPPED | OUTPATIENT
Start: 2018-04-20 | End: 2018-04-30

## 2018-04-20 RX ORDER — DOXYCYCLINE 100 MG/1
100 CAPSULE ORAL EVERY 12 HOURS
Qty: 14 CAPSULE | Refills: 0 | Status: SHIPPED | OUTPATIENT
Start: 2018-04-20 | End: 2018-05-17

## 2018-04-20 RX ORDER — ALBUTEROL SULFATE 0.83 MG/ML
2.5 SOLUTION RESPIRATORY (INHALATION)
Status: COMPLETED | OUTPATIENT
Start: 2018-04-20 | End: 2018-04-20

## 2018-04-20 RX ORDER — DEXAMETHASONE SODIUM PHOSPHATE 4 MG/ML
4 INJECTION, SOLUTION INTRA-ARTICULAR; INTRALESIONAL; INTRAMUSCULAR; INTRAVENOUS; SOFT TISSUE
Status: COMPLETED | OUTPATIENT
Start: 2018-04-20 | End: 2018-04-20

## 2018-04-20 RX ORDER — ALBUTEROL SULFATE 90 UG/1
2 AEROSOL, METERED RESPIRATORY (INHALATION) EVERY 4 HOURS PRN
Qty: 1 INHALER | Refills: 0 | Status: SHIPPED | OUTPATIENT
Start: 2018-04-20 | End: 2018-04-23 | Stop reason: CLARIF

## 2018-04-20 RX ADMIN — DEXAMETHASONE SODIUM PHOSPHATE 4 MG: 4 INJECTION, SOLUTION INTRA-ARTICULAR; INTRALESIONAL; INTRAMUSCULAR; INTRAVENOUS; SOFT TISSUE at 11:04

## 2018-04-20 RX ADMIN — ALBUTEROL SULFATE 2.5 MG: 0.83 SOLUTION RESPIRATORY (INHALATION) at 11:04

## 2018-04-20 NOTE — PROGRESS NOTES
Albuterol nebulized treatment given orally and Decadron was given IM in the left gluteus lizeth. Both were tolerated well.

## 2018-04-23 RX ORDER — ALBUTEROL SULFATE 90 UG/1
2 AEROSOL, METERED RESPIRATORY (INHALATION) EVERY 4 HOURS PRN
Qty: 1 INHALER | Refills: 2 | Status: SHIPPED | OUTPATIENT
Start: 2018-04-23

## 2018-04-23 NOTE — PROGRESS NOTES
Routine Office Visit    Patient Name: Lucila Myers    : 1935  MRN: [id number]    Subjective:  Lucila is a 82 y.o. female who presents today for:   Chief Complaint   Patient presents with    Sore Throat    Cough    Wheezing       82 year old female comes in with complaint of 3-4 days worsening cough, wheezing, extreme fatigue, and fever. She reports that actually symptoms first started a month ago with coughing and wheezing but in the last 304 days it worsened and developed a fever. She reports she has no energy to do anything. She reports no recent travel. No chest pain. No leg swelling.     Past Medical History  Past Medical History:   Diagnosis Date    Arthritis        Past Surgical History  Past Surgical History:   Procedure Laterality Date    APPENDECTOMY      HYSTERECTOMY          Family History  Family History   Problem Relation Age of Onset    Rectal cancer Mother     Alcohol abuse Father        Social History  Social History     Social History    Marital status:      Spouse name: N/A    Number of children: N/A    Years of education: N/A     Occupational History    Not on file.     Social History Main Topics    Smoking status: Never Smoker    Smokeless tobacco: Never Used    Alcohol use No    Drug use: No    Sexual activity: No     Other Topics Concern    Not on file     Social History Narrative    No narrative on file       Current Medications  Current Outpatient Prescriptions on File Prior to Visit   Medication Sig Dispense Refill    traMADol (ULTRAM) 50 mg tablet TAKE 1 TABLET(50 MG) BY MOUTH EVERY 8 HOURS AS NEEDED FOR SEVERE PAIN 90 tablet 0     No current facility-administered medications on file prior to visit.        Allergies   Review of patient's allergies indicates:  No Known Allergies    Review of Systems   Constitutional: Positive for chills, fatigue and fever.   HENT: Positive for congestion and sore throat. Negative for ear discharge, postnasal drip, rhinorrhea  "and sinus pressure.    Eyes: Negative for discharge.   Respiratory: Positive for cough (productive), shortness of breath and wheezing.    Cardiovascular: Negative for palpitations.   Gastrointestinal: Negative for abdominal pain, blood in stool, constipation and diarrhea.   Genitourinary: Negative for dysuria.   Skin: Negative for rash.       /72 (BP Location: Left arm, Patient Position: Sitting, BP Method: Medium (Manual))   Pulse 60   Temp 98.2 °F (36.8 °C) (Oral)   Resp 17   Ht 4' 11" (1.499 m)   Wt 67.1 kg (147 lb 14.9 oz)   SpO2 96%   BMI 29.88 kg/m²     Physical Exam   Constitutional:  Non-toxic appearance. She appears ill.   HENT:   Right Ear: Tympanic membrane, external ear and ear canal normal.   Left Ear: Tympanic membrane, external ear and ear canal normal.   Nose: Rhinorrhea present.   Mouth/Throat: Posterior oropharyngeal erythema present. No oropharyngeal exudate. No tonsillar exudate.   Neck: Trachea normal and normal range of motion. Neck supple. No thyromegaly present.   Cardiovascular: Normal rate, S1 normal and S2 normal.    Pulses:       Radial pulses are 2+ on the right side, and 2+ on the left side.   Pulmonary/Chest: No tachypnea. No respiratory distress. She has wheezes. She has rhonchi. She has no rales.   postnebulizer examination- improved air exchange with decreased wheezes   Abdominal: Soft. Bowel sounds are normal. There is no hepatosplenomegaly. There is no tenderness.   Lymphadenopathy:     She has no cervical adenopathy.   Skin: Capillary refill takes less than 2 seconds.   Vitals reviewed.      Assessment/Plan:  Lucila was seen today for sore throat, cough and wheezing. She is a new patient to me.    Diagnoses and all orders for this visit:    Acute bronchitis, unspecified organism  -     albuterol nebulizer solution 2.5 mg; Take 3 mLs (2.5 mg total) by nebulization one time.  -     dexamethasone injection 4 mg; Inject 1 mL (4 mg total) into the muscle one time.  -     " doxycycline (VIBRAMYCIN) 100 MG Cap; Take 1 capsule (100 mg total) by mouth every 12 (twelve) hours.  -     promethazine-dextromethorphan (PROMETHAZINE-DM) 6.25-15 mg/5 mL Syrp; Take 5 mLs by mouth 4 (four) times daily as needed.  -     albuterol 90 mcg/actuation inhaler; Inhale 2 puffs into the lungs every 4 (four) hours as needed for Wheezing or Shortness of Breath. Rescue  -     inhalation spacing device; Use as directed for inhalation.    Given symptoms have been going on for 6 weeks will treat with antibiotic.  Also patient to use albuterol.  Patient educated on correct use of inhaler via video: https://www.youtube.com/watch?v=[email or web address].  Patient was able to explain back how to use inhaler.  Advised rest and plenty of fluids.  Reevaluate next week.

## 2018-04-26 ENCOUNTER — OFFICE VISIT (OUTPATIENT)
Dept: FAMILY MEDICINE | Facility: CLINIC | Age: 83
End: 2018-04-26
Payer: MEDICARE

## 2018-04-26 VITALS
HEART RATE: 94 BPM | RESPIRATION RATE: 17 BRPM | WEIGHT: 143.94 LBS | SYSTOLIC BLOOD PRESSURE: 120 MMHG | TEMPERATURE: 98 F | DIASTOLIC BLOOD PRESSURE: 80 MMHG | OXYGEN SATURATION: 96 % | BODY MASS INDEX: 29.02 KG/M2 | HEIGHT: 59 IN

## 2018-04-26 DIAGNOSIS — R05.3 CHRONIC COUGH: Primary | ICD-10-CM

## 2018-04-26 PROCEDURE — 99999 PR PBB SHADOW E&M-EST. PATIENT-LVL IV: CPT | Mod: PBBFAC,,, | Performed by: FAMILY MEDICINE

## 2018-04-26 PROCEDURE — 99214 OFFICE O/P EST MOD 30 MIN: CPT | Mod: S$GLB,,, | Performed by: FAMILY MEDICINE

## 2018-04-28 NOTE — PROGRESS NOTES
Routine Office Visit    Patient Name: Lucila Myers    : 1935  MRN: [id number]    Subjective:  Lucila is a 82 y.o. female who presents today for:   Chief Complaint   Patient presents with    Cough       82 year old female comes in for follow up on acute bronchitis. She no longer has fever or chills, or shortness of breath. But her cough continues. It is the same cough as before the bronchitis episode. She states that it has been going on since December. No choking cessation or problem swallowing reported. No pain in throat, no coughing blood. No sneezing.     Past Medical History  Past Medical History:   Diagnosis Date    Arthritis        Past Surgical History  Past Surgical History:   Procedure Laterality Date    APPENDECTOMY      HYSTERECTOMY          Family History  Family History   Problem Relation Age of Onset    Rectal cancer Mother     Alcohol abuse Father        Social History  Social History     Social History    Marital status:      Spouse name: N/A    Number of children: N/A    Years of education: N/A     Occupational History    Not on file.     Social History Main Topics    Smoking status: Never Smoker    Smokeless tobacco: Never Used    Alcohol use No    Drug use: No    Sexual activity: No     Other Topics Concern    Not on file     Social History Narrative    No narrative on file       Current Medications  Current Outpatient Prescriptions on File Prior to Visit   Medication Sig Dispense Refill    albuterol 90 mcg/actuation inhaler Inhale 2 puffs into the lungs every 4 (four) hours as needed for Wheezing or Shortness of Breath. Rescue 1 Inhaler 2    doxycycline (VIBRAMYCIN) 100 MG Cap Take 1 capsule (100 mg total) by mouth every 12 (twelve) hours. 14 capsule 0    inhalation spacing device Use as directed for inhalation. 1 Device 0    promethazine-dextromethorphan (PROMETHAZINE-DM) 6.25-15 mg/5 mL Syrp Take 5 mLs by mouth 4 (four) times daily as needed. 240 mL 0     "traMADol (ULTRAM) 50 mg tablet TAKE 1 TABLET(50 MG) BY MOUTH EVERY 8 HOURS AS NEEDED FOR SEVERE PAIN 90 tablet 0     No current facility-administered medications on file prior to visit.        Allergies   Review of patient's allergies indicates:  No Known Allergies    Review of Systems   Constitutional: Negative for chills and fever.   HENT: Negative for congestion.    Respiratory: Positive for cough (dry). Negative for chest tightness, shortness of breath and wheezing.    Cardiovascular: Negative for chest pain and palpitations.       /80 (BP Location: Right arm, Patient Position: Sitting, BP Method: Medium (Manual))   Pulse 94   Temp 97.7 °F (36.5 °C) (Oral)   Resp 17   Ht 4' 11" (1.499 m)   Wt 65.3 kg (143 lb 15.4 oz)   SpO2 96%   BMI 29.08 kg/m²     Physical Exam   Constitutional: She appears well-developed and well-nourished.   HENT:   Head: Normocephalic and atraumatic.   Right Ear: External ear normal.   Left Ear: External ear normal.   Nose: Nose normal.   Mouth/Throat: Oropharynx is clear and moist. No oropharyngeal exudate.   Eyes: Conjunctivae and EOM are normal. Pupils are equal, round, and reactive to light. Right eye exhibits no discharge. Left eye exhibits no discharge.   Neck: Normal range of motion. Neck supple. No tracheal deviation present.   Cardiovascular: Normal rate, regular rhythm, normal heart sounds and intact distal pulses.    No murmur heard.  Pulmonary/Chest: Effort normal and breath sounds normal. She has no wheezes. She has no rales.   Abdominal: Soft. Bowel sounds are normal. She exhibits no mass. There is no tenderness.   Lymphadenopathy:        Head (right side): No submental and no submandibular adenopathy present.        Head (left side): No submental and no submandibular adenopathy present.        Right cervical: No superficial cervical, no deep cervical and no posterior cervical adenopathy present.       Left cervical: No superficial cervical, no deep cervical and " no posterior cervical adenopathy present.   Psychiatric: She has a normal mood and affect.   Vitals reviewed.      Assessment/Plan:  Lucila was seen today for cough.    Diagnoses and all orders for this visit:    Chronic cough  -     X-Ray Chest PA And Lateral; Future  -     Ambulatory referral to Pulmonology  -     Ambulatory referral to ENT  -     Comprehensive metabolic panel; Future  -     CBC auto differential; Future    Check CXR and refer for pulmonary and ENT evaluations.

## 2018-05-02 ENCOUNTER — TELEPHONE (OUTPATIENT)
Dept: ADMINISTRATIVE | Facility: HOSPITAL | Age: 83
End: 2018-05-02

## 2018-05-09 ENCOUNTER — APPOINTMENT (OUTPATIENT)
Dept: RADIOLOGY | Facility: HOSPITAL | Age: 83
End: 2018-05-09
Attending: FAMILY MEDICINE
Payer: MEDICARE

## 2018-05-09 DIAGNOSIS — R05.3 CHRONIC COUGH: ICD-10-CM

## 2018-05-09 PROCEDURE — 71046 X-RAY EXAM CHEST 2 VIEWS: CPT | Mod: TC,FY,PN

## 2018-05-09 PROCEDURE — 71046 X-RAY EXAM CHEST 2 VIEWS: CPT | Mod: 26,,, | Performed by: RADIOLOGY

## 2018-05-17 ENCOUNTER — OFFICE VISIT (OUTPATIENT)
Dept: SLEEP MEDICINE | Facility: CLINIC | Age: 83
End: 2018-05-17
Payer: MEDICARE

## 2018-05-17 VITALS
DIASTOLIC BLOOD PRESSURE: 90 MMHG | WEIGHT: 147.25 LBS | SYSTOLIC BLOOD PRESSURE: 150 MMHG | OXYGEN SATURATION: 98 % | HEART RATE: 94 BPM | HEIGHT: 57 IN | BODY MASS INDEX: 31.77 KG/M2

## 2018-05-17 DIAGNOSIS — R68.2 DRY MOUTH: ICD-10-CM

## 2018-05-17 DIAGNOSIS — R05.3 CHRONIC COUGH: ICD-10-CM

## 2018-05-17 DIAGNOSIS — R09.81 NASAL CONGESTION: Primary | ICD-10-CM

## 2018-05-17 PROCEDURE — 99999 PR PBB SHADOW E&M-EST. PATIENT-LVL III: CPT | Mod: PBBFAC,,, | Performed by: INTERNAL MEDICINE

## 2018-05-17 PROCEDURE — 99203 OFFICE O/P NEW LOW 30 MIN: CPT | Mod: S$GLB,,, | Performed by: INTERNAL MEDICINE

## 2018-05-17 NOTE — PROGRESS NOTES
Lucila Myers  was seen as a new patient at the request  Cesar Nuñez Jr., MD for the evaluation of  sob.    CHIEF COMPLAINT:  Shortness of Breath and Abnormal Chest X-ray      HISTORY OF PRESENT ILLNESS: Lucila Myers is a 82 y.o. female  has a past medical history of Arthritis and Chronic lower back pain.  Patient presented to ED on 12/26/18 with cough and sob.  Patient was sent home with doxy and cough suppressant.  +diarrhea and nausea.  Patient was seen by internist on several occasions.  S/p dexamethasone im 4/18.  Cough and congestion improved.      Currently, patient denied coughing.  No wheezing.  Limited activities due to back pain.  No nasal congestion.  + gerd symptoms improve with ppi    PAST MEDICAL HISTORY:    Active Ambulatory Problems     Diagnosis Date Noted    Chronic ankle pain 12/16/2015    Decreased range of hip movement 03/22/2017    Obesity (BMI 30.0-34.9) 03/22/2017    GERD (gastroesophageal reflux disease) 03/22/2017    Hip pain, bilateral 04/07/2017    Weakness of both hips 04/07/2017    Weakness of trunk musculature 04/07/2017    Foot pain, bilateral 04/21/2017    Arthritis, multiple joint involvement 01/31/2018    Degenerative disc disease at L5-S1 level 01/31/2018     Resolved Ambulatory Problems     Diagnosis Date Noted    No Resolved Ambulatory Problems     Past Medical History:   Diagnosis Date    Arthritis     Chronic lower back pain                 PAST SURGICAL HISTORY:    Past Surgical History:   Procedure Laterality Date    APPENDECTOMY      HYSTERECTOMY           FAMILY HISTORY:                Family History   Problem Relation Age of Onset    Rectal cancer Mother     Alcohol abuse Father        SOCIAL HISTORY:          Tobacco:   History   Smoking Status    Never Smoker   Smokeless Tobacco    Never Used     alcohol use:    History   Alcohol Use No               Occupation:  House wife    ALLERGIES:  Review of patient's allergies indicates:  No Known  "Allergies    CURRENT MEDICATIONS:    Current Outpatient Prescriptions   Medication Sig Dispense Refill    albuterol 90 mcg/actuation inhaler Inhale 2 puffs into the lungs every 4 (four) hours as needed for Wheezing or Shortness of Breath. Rescue 1 Inhaler 2    inhalation spacing device Use as directed for inhalation. 1 Device 0    traMADol (ULTRAM) 50 mg tablet TAKE 1 TABLET(50 MG) BY MOUTH EVERY 8 HOURS AS NEEDED FOR SEVERE PAIN 90 tablet 0     No current facility-administered medications for this visit.                   REVIEW OF SYSTEMS:     Pulmonary related symptoms as per HPI.  Gen:  no weight loss, no fever, no night sweat  HEENT:  no visual changes, no sore throat, no hearing loss  CV:  No chest pain, no orthopnea, no PND  GI:  no melena, no hematochezia, no diarhea, no constipation.  :  no dysuria, no hematuria, no hesistancy, no dribbling  Neuro:  no syncope, no vertigo, no tinitus, occasional dizziness a/w rapid changes in position.  Psych:  No homocide or suicide ideation; no depression.  Endocrine:  No heat or cold intolerance.  Sleep:  No snoring; no witnessed apnea.  Feeling rested upon awake  Otherwise, a balance of systems reviewed is negative.          PHYSICAL EXAM:  Vitals:    05/17/18 1500   BP: (!) 150/90   Pulse: 94   SpO2: 98%   Weight: 66.8 kg (147 lb 4.3 oz)   Height: 4' 9" (1.448 m)   PainSc: 0-No pain     Body mass index is 31.87 kg/m².     GENERAL:  well develop; no apparent distress  HEENT:  no nasal congestion; no discharge noted; class 4 modified mallampatti.   NECK:  supple; no palpable masses.  CARDIO: regular rate and rhythm  PULM:  clear to auscultation bilaterally; no intercostals retractions; no accessory muscle usage   ABDOMEN:  soft nontender/nondistended.  +bowel sound  EXTREMITIES no cce  NEURO:  CN II-XII intact.  5/5 motor in all extremities.  sensation grossly intact   to light touch.  PSYCH:  normal affect.  Alert and oriented x 4    LABS  Pulmonary Functions " Testing Results: none  ABG none  CXR:  4/26/18 no effusion or consolidation  CT CHEST:  none    Echo 12/14/16  CONCLUSIONS     1 - Low normal to mildly depressed left ventricular systolic function (EF 50-55%).  Normal wall motion.     2 - Concentric remodeling.     3 - Left ventricular diastolic dysfunction.     4 - Trivial aortic regurgitation.     5 - Trivial to mild mitral regurgitation.     6 - Mild tricuspid regurgitation.     7 - The estimated PA systolic pressure is 36 mmHg.     ASSESSMENT    ICD-10-CM ICD-9-CM    1. Nasal congestion R09.81 478.19    2. Chronic cough R05 786.2    3. Dry mouth R68.2 527.7        PLAN:  Chronic cough -resolved over month.  History suggestive of upper airway congestion.  cxr without acute changes.  Optimize nasal congestion.      Nasal congestion - sinus irrigation and nasal steroid.     Dry mouth - recommend biotine.  Follow up with pcp.        Patient will Follow-up if symptoms worsen or fail to improve. with md/np.    CC: Send copy of this note to Cesar Nuñez Jr., MD

## 2018-05-17 NOTE — LETTER
May 17, 2018      Cesar Nuñez Jr., MD  [address]  [place] [address]           [facility]  [address]  [place] [address]  Phone: [phone]  Fax: [phone]          Patient: Lucila Myers   MR Number: [id number]   YOB: 1935   Date of Visit: 5/17/2018       Dear Dr. Cesar Nuñez Jr.:    Thank you for referring uLcila Myers to me for evaluation. Attached you will find relevant portions of my assessment and plan of care.    If you have questions, please do not hesitate to call me. I look forward to following Lucila Myers along with you.    Sincerely,    Guillermo Montaño MD    Enclosure  CC:  No Recipients    If you would like to receive this communication electronically, please contact [email or web address] or [phone] to request more information on [employer] access.    For providers and/or their staff who would like to refer a patient to [facility], please contact us through our one-stop-shop provider referral line, [facility], at [phone].    If you feel you have received this communication in error or would no longer like to receive these types of communications, please e-mail [email or web address]

## 2018-05-20 DIAGNOSIS — M25.579 CHRONIC ANKLE PAIN, UNSPECIFIED LATERALITY: ICD-10-CM

## 2018-05-20 DIAGNOSIS — G89.29 CHRONIC ANKLE PAIN, UNSPECIFIED LATERALITY: ICD-10-CM

## 2018-05-20 PROBLEM — F11.20 UNCOMPLICATED OPIOID DEPENDENCE: Status: ACTIVE | Noted: 2018-05-20

## 2018-05-20 RX ORDER — TRAMADOL HYDROCHLORIDE 50 MG/1
TABLET ORAL
Qty: 90 TABLET | Refills: 0 | Status: SHIPPED | OUTPATIENT
Start: 2018-05-20 | End: 2018-06-18 | Stop reason: SDUPTHER

## 2018-06-18 DIAGNOSIS — M25.579 CHRONIC ANKLE PAIN, UNSPECIFIED LATERALITY: ICD-10-CM

## 2018-06-18 DIAGNOSIS — G89.29 CHRONIC ANKLE PAIN, UNSPECIFIED LATERALITY: ICD-10-CM

## 2018-06-19 RX ORDER — TRAMADOL HYDROCHLORIDE 50 MG/1
TABLET ORAL
Qty: 90 TABLET | Refills: 0 | Status: SHIPPED | OUTPATIENT
Start: 2018-06-19 | End: 2018-07-18 | Stop reason: SDUPTHER

## 2018-07-18 DIAGNOSIS — G89.29 CHRONIC ANKLE PAIN, UNSPECIFIED LATERALITY: ICD-10-CM

## 2018-07-18 DIAGNOSIS — M25.579 CHRONIC ANKLE PAIN, UNSPECIFIED LATERALITY: ICD-10-CM

## 2018-07-18 RX ORDER — TRAMADOL HYDROCHLORIDE 50 MG/1
TABLET ORAL
Qty: 90 TABLET | Refills: 0 | Status: SHIPPED | OUTPATIENT
Start: 2018-07-18 | End: 2018-09-03 | Stop reason: SDUPTHER

## 2018-09-03 DIAGNOSIS — G89.29 CHRONIC ANKLE PAIN, UNSPECIFIED LATERALITY: ICD-10-CM

## 2018-09-03 DIAGNOSIS — M25.579 CHRONIC ANKLE PAIN, UNSPECIFIED LATERALITY: ICD-10-CM

## 2018-09-04 RX ORDER — TRAMADOL HYDROCHLORIDE 50 MG/1
TABLET ORAL
Qty: 90 TABLET | Refills: 0 | Status: SHIPPED | OUTPATIENT
Start: 2018-09-04 | End: 2018-10-06 | Stop reason: SDUPTHER

## 2018-10-06 DIAGNOSIS — M25.579 CHRONIC ANKLE PAIN, UNSPECIFIED LATERALITY: ICD-10-CM

## 2018-10-06 DIAGNOSIS — G89.29 CHRONIC ANKLE PAIN, UNSPECIFIED LATERALITY: ICD-10-CM

## 2018-10-08 RX ORDER — TRAMADOL HYDROCHLORIDE 50 MG/1
TABLET ORAL
Qty: 90 TABLET | Refills: 0 | Status: SHIPPED | OUTPATIENT
Start: 2018-10-08

## 2019-04-09 DIAGNOSIS — J20.9 ACUTE BRONCHITIS, UNSPECIFIED ORGANISM: ICD-10-CM

## 2019-04-09 RX ORDER — PROMETHAZINE HYDROCHLORIDE AND DEXTROMETHORPHAN HYDROBROMIDE 6.25; 15 MG/5ML; MG/5ML
SYRUP ORAL
Qty: 240 ML | Refills: 0 | OUTPATIENT
Start: 2019-04-09

## 2019-12-20 DIAGNOSIS — H53.2 DOUBLE VISION: Primary | ICD-10-CM

## 2019-12-20 DIAGNOSIS — H50.15 ALTERNATING EXOTROPIA: ICD-10-CM

## 2020-01-07 ENCOUNTER — HOSPITAL ENCOUNTER (OUTPATIENT)
Dept: RADIOLOGY | Facility: HOSPITAL | Age: 85
Discharge: HOME | End: 2020-01-07
Payer: MEDICARE

## 2020-01-07 DIAGNOSIS — H53.2 DOUBLE VISION: ICD-10-CM

## 2020-01-07 DIAGNOSIS — H50.15 ALTERNATING EXOTROPIA: ICD-10-CM

## 2020-01-07 PROCEDURE — 70543 MRI ORBITS W W/O CONTRAST: ICD-10-PCS | Mod: 26,,, | Performed by: RADIOLOGY

## 2020-01-07 PROCEDURE — 70543 MRI ORBT/FAC/NCK W/O &W/DYE: CPT | Mod: TC

## 2020-01-07 PROCEDURE — 25500020 PHARM REV CODE 255

## 2020-01-07 PROCEDURE — 70543 MRI ORBT/FAC/NCK W/O &W/DYE: CPT | Mod: 26,,, | Performed by: RADIOLOGY

## 2020-01-07 PROCEDURE — A9585 GADOBUTROL INJECTION: HCPCS

## 2020-01-07 RX ORDER — GADOBUTROL 604.72 MG/ML
7 INJECTION INTRAVENOUS
Status: COMPLETED | OUTPATIENT
Start: 2020-01-07 | End: 2020-01-07

## 2020-01-07 RX ADMIN — GADOBUTROL 7 ML: 604.72 INJECTION INTRAVENOUS at 12:01

## 2020-01-31 DIAGNOSIS — M48.061 LUMBAR STENOSIS: Primary | ICD-10-CM

## 2020-02-14 ENCOUNTER — HOSPITAL ENCOUNTER (OUTPATIENT)
Dept: RADIOLOGY | Facility: HOSPITAL | Age: 85
Discharge: HOME | End: 2020-02-14
Attending: PHYSICAL MEDICINE & REHABILITATION
Payer: MEDICARE

## 2020-02-14 DIAGNOSIS — M48.061 LUMBAR STENOSIS: ICD-10-CM

## 2020-02-14 PROCEDURE — 72148 MRI LUMBAR SPINE W/O DYE: CPT | Mod: 26,,, | Performed by: RADIOLOGY

## 2020-02-14 PROCEDURE — 72148 MRI LUMBAR SPINE WITHOUT CONTRAST: ICD-10-PCS | Mod: 26,,, | Performed by: RADIOLOGY

## 2020-02-14 PROCEDURE — 72148 MRI LUMBAR SPINE W/O DYE: CPT | Mod: TC

## 2023-02-22 DIAGNOSIS — M54.16 LUMBAR RADICULITIS: Primary | ICD-10-CM

## 2023-03-01 ENCOUNTER — HOSPITAL ENCOUNTER (OUTPATIENT)
Dept: RADIOLOGY | Facility: HOSPITAL | Age: 88
Discharge: HOME | End: 2023-03-01
Payer: MEDICARE

## 2023-03-01 DIAGNOSIS — M54.16 LUMBAR RADICULITIS: ICD-10-CM

## 2023-03-01 PROCEDURE — 72148 MRI LUMBAR SPINE W/O DYE: CPT | Mod: TC

## 2023-03-01 PROCEDURE — 72148 MRI LUMBAR SPINE W/O DYE: CPT | Mod: 26,,, | Performed by: INTERNAL MEDICINE

## 2023-03-01 PROCEDURE — 72148 MRI LUMBAR SPINE WITHOUT CONTRAST: ICD-10-PCS | Mod: 26,,, | Performed by: INTERNAL MEDICINE

## 2024-01-01 ENCOUNTER — HOSPITAL ENCOUNTER (EMERGENCY)
Facility: HOSPITAL | Age: 89
End: 2024-12-02
Attending: EMERGENCY MEDICINE
Payer: MEDICARE

## 2024-01-01 VITALS
WEIGHT: 123 LBS | OXYGEN SATURATION: 81 % | DIASTOLIC BLOOD PRESSURE: 82 MMHG | SYSTOLIC BLOOD PRESSURE: 135 MMHG | TEMPERATURE: 98 F | BODY MASS INDEX: 26.62 KG/M2 | RESPIRATION RATE: 93 BRPM

## 2024-01-01 DIAGNOSIS — J96.01 ACUTE RESPIRATORY FAILURE WITH HYPOXIA: ICD-10-CM

## 2024-01-01 DIAGNOSIS — J81.0 ACUTE PULMONARY EDEMA: Primary | ICD-10-CM

## 2024-01-01 DIAGNOSIS — R06.03 RESPIRATORY DISTRESS: ICD-10-CM

## 2024-01-01 DIAGNOSIS — R00.0 TACHYCARDIA: ICD-10-CM

## 2024-01-01 DIAGNOSIS — I46.9 CARDIOPULMONARY ARREST: ICD-10-CM

## 2024-01-01 LAB
ALBUMIN SERPL BCP-MCNC: 2.5 G/DL (ref 3.5–5.2)
ALLENS TEST: ABNORMAL
ALP SERPL-CCNC: 95 U/L (ref 40–150)
ALT SERPL W/O P-5'-P-CCNC: 14 U/L (ref 10–44)
ANION GAP SERPL CALC-SCNC: 18 MMOL/L (ref 8–16)
AST SERPL-CCNC: 51 U/L (ref 10–40)
BASOPHILS # BLD AUTO: 0.17 K/UL (ref 0–0.2)
BASOPHILS NFR BLD: 0.5 % (ref 0–1.9)
BILIRUB SERPL-MCNC: 1 MG/DL (ref 0.1–1)
BNP SERPL-MCNC: 240 PG/ML (ref 0–99)
BUN SERPL-MCNC: 15 MG/DL (ref 8–23)
CALCIUM SERPL-MCNC: 9.9 MG/DL (ref 8.7–10.5)
CHLORIDE SERPL-SCNC: 101 MMOL/L (ref 95–110)
CO2 SERPL-SCNC: 17 MMOL/L (ref 23–29)
CREAT SERPL-MCNC: 1 MG/DL (ref 0.5–1.4)
CTP QC/QA: YES
CTP QC/QA: YES
DIFFERENTIAL METHOD BLD: ABNORMAL
EOSINOPHIL # BLD AUTO: 0 K/UL (ref 0–0.5)
EOSINOPHIL NFR BLD: 0.1 % (ref 0–8)
ERYTHROCYTE [DISTWIDTH] IN BLOOD BY AUTOMATED COUNT: 13 % (ref 11.5–14.5)
EST. GFR  (NO RACE VARIABLE): 55 ML/MIN/1.73 M^2
GLUCOSE SERPL-MCNC: 148 MG/DL (ref 70–110)
HCT VFR BLD AUTO: 44 % (ref 37–48.5)
HGB BLD-MCNC: 14.8 G/DL (ref 12–16)
IMM GRANULOCYTES # BLD AUTO: 0.66 K/UL (ref 0–0.04)
IMM GRANULOCYTES NFR BLD AUTO: 1.8 % (ref 0–0.5)
LACTATE SERPL-SCNC: 6.5 MMOL/L (ref 0.5–2.2)
LDH SERPL L TO P-CCNC: 4.66 MMOL/L (ref 0.5–2.2)
LYMPHOCYTES # BLD AUTO: 1.5 K/UL (ref 1–4.8)
LYMPHOCYTES NFR BLD: 4 % (ref 18–48)
MCH RBC QN AUTO: 30.3 PG (ref 27–31)
MCHC RBC AUTO-ENTMCNC: 33.6 G/DL (ref 32–36)
MCV RBC AUTO: 90 FL (ref 82–98)
MONOCYTES # BLD AUTO: 1.8 K/UL (ref 0.3–1)
MONOCYTES NFR BLD: 4.7 % (ref 4–15)
NEUTROPHILS # BLD AUTO: 33.3 K/UL (ref 1.8–7.7)
NEUTROPHILS NFR BLD: 88.9 % (ref 38–73)
NRBC BLD-RTO: 0 /100 WBC
PLATELET # BLD AUTO: 349 K/UL (ref 150–450)
PMV BLD AUTO: 10 FL (ref 9.2–12.9)
POC MOLECULAR INFLUENZA A AGN: NEGATIVE
POC MOLECULAR INFLUENZA B AGN: NEGATIVE
POTASSIUM SERPL-SCNC: 4.1 MMOL/L (ref 3.5–5.1)
PROT SERPL-MCNC: 7.3 G/DL (ref 6–8.4)
RBC # BLD AUTO: 4.88 M/UL (ref 4–5.4)
SAMPLE: ABNORMAL
SARS-COV-2 RDRP RESP QL NAA+PROBE: NEGATIVE
SITE: ABNORMAL
SODIUM SERPL-SCNC: 136 MMOL/L (ref 136–145)
TROPONIN I SERPL DL<=0.01 NG/ML-MCNC: 3.61 NG/ML (ref 0–0.03)
WBC # BLD AUTO: 37.46 K/UL (ref 3.9–12.7)

## 2024-01-01 PROCEDURE — 27000221 HC OXYGEN, UP TO 24 HOURS

## 2024-01-01 PROCEDURE — 87502 INFLUENZA DNA AMP PROBE: CPT

## 2024-01-01 PROCEDURE — 96361 HYDRATE IV INFUSION ADD-ON: CPT

## 2024-01-01 PROCEDURE — 25000242 PHARM REV CODE 250 ALT 637 W/ HCPCS: Performed by: EMERGENCY MEDICINE

## 2024-01-01 PROCEDURE — 96375 TX/PRO/DX INJ NEW DRUG ADDON: CPT

## 2024-01-01 PROCEDURE — 94640 AIRWAY INHALATION TREATMENT: CPT

## 2024-01-01 PROCEDURE — 27000190 HC CPAP FULL FACE MASK W/VALVE

## 2024-01-01 PROCEDURE — 84484 ASSAY OF TROPONIN QUANT: CPT | Performed by: EMERGENCY MEDICINE

## 2024-01-01 PROCEDURE — 93010 ELECTROCARDIOGRAM REPORT: CPT | Mod: ,,, | Performed by: INTERNAL MEDICINE

## 2024-01-01 PROCEDURE — 99291 CRITICAL CARE FIRST HOUR: CPT | Mod: ,,, | Performed by: INTERNAL MEDICINE

## 2024-01-01 PROCEDURE — 96365 THER/PROPH/DIAG IV INF INIT: CPT

## 2024-01-01 PROCEDURE — 83605 ASSAY OF LACTIC ACID: CPT | Performed by: EMERGENCY MEDICINE

## 2024-01-01 PROCEDURE — 87635 SARS-COV-2 COVID-19 AMP PRB: CPT | Performed by: EMERGENCY MEDICINE

## 2024-01-01 PROCEDURE — 94660 CPAP INITIATION&MGMT: CPT

## 2024-01-01 PROCEDURE — 83880 ASSAY OF NATRIURETIC PEPTIDE: CPT | Performed by: EMERGENCY MEDICINE

## 2024-01-01 PROCEDURE — 63600175 PHARM REV CODE 636 W HCPCS: Performed by: EMERGENCY MEDICINE

## 2024-01-01 PROCEDURE — 93005 ELECTROCARDIOGRAM TRACING: CPT

## 2024-01-01 PROCEDURE — 85025 COMPLETE CBC W/AUTO DIFF WBC: CPT | Performed by: EMERGENCY MEDICINE

## 2024-01-01 PROCEDURE — 87040 BLOOD CULTURE FOR BACTERIA: CPT | Mod: 59 | Performed by: EMERGENCY MEDICINE

## 2024-01-01 PROCEDURE — 99291 CRITICAL CARE FIRST HOUR: CPT

## 2024-01-01 PROCEDURE — 96374 THER/PROPH/DIAG INJ IV PUSH: CPT | Mod: 59

## 2024-01-01 PROCEDURE — 94761 N-INVAS EAR/PLS OXIMETRY MLT: CPT

## 2024-01-01 PROCEDURE — 99900035 HC TECH TIME PER 15 MIN (STAT)

## 2024-01-01 PROCEDURE — 99215 OFFICE O/P EST HI 40 MIN: CPT | Mod: ,,, | Performed by: INTERNAL MEDICINE

## 2024-01-01 PROCEDURE — 25000003 PHARM REV CODE 250: Performed by: EMERGENCY MEDICINE

## 2024-01-01 PROCEDURE — 80053 COMPREHEN METABOLIC PANEL: CPT | Performed by: EMERGENCY MEDICINE

## 2024-01-01 PROCEDURE — 83605 ASSAY OF LACTIC ACID: CPT

## 2024-01-01 RX ORDER — CEFTRIAXONE 2 G/1
2 INJECTION, POWDER, FOR SOLUTION INTRAMUSCULAR; INTRAVENOUS ONCE
Status: COMPLETED | OUTPATIENT
Start: 2024-01-01 | End: 2024-01-01

## 2024-01-01 RX ORDER — FUROSEMIDE 10 MG/ML
40 INJECTION INTRAMUSCULAR; INTRAVENOUS
Status: COMPLETED | OUTPATIENT
Start: 2024-01-01 | End: 2024-01-01

## 2024-01-01 RX ORDER — METHYLPREDNISOLONE SOD SUCC 125 MG
125 VIAL (EA) INJECTION
Status: COMPLETED | OUTPATIENT
Start: 2024-01-01 | End: 2024-01-01

## 2024-01-01 RX ORDER — METOPROLOL TARTRATE 1 MG/ML
2.5 INJECTION, SOLUTION INTRAVENOUS
Status: COMPLETED | OUTPATIENT
Start: 2024-01-01 | End: 2024-01-01

## 2024-01-01 RX ORDER — ALBUTEROL SULFATE 2.5 MG/.5ML
10 SOLUTION RESPIRATORY (INHALATION)
Status: COMPLETED | OUTPATIENT
Start: 2024-01-01 | End: 2024-01-01

## 2024-01-01 RX ORDER — IPRATROPIUM BROMIDE 0.5 MG/2.5ML
1 SOLUTION RESPIRATORY (INHALATION)
Status: COMPLETED | OUTPATIENT
Start: 2024-01-01 | End: 2024-01-01

## 2024-01-01 RX ADMIN — IPRATROPIUM BROMIDE 1 MG: 0.5 SOLUTION RESPIRATORY (INHALATION) at 10:12

## 2024-01-01 RX ADMIN — SODIUM CHLORIDE 1365 ML: 9 INJECTION, SOLUTION INTRAVENOUS at 10:12

## 2024-01-01 RX ADMIN — AZITHROMYCIN MONOHYDRATE 500 MG: 500 INJECTION, POWDER, LYOPHILIZED, FOR SOLUTION INTRAVENOUS at 10:12

## 2024-01-01 RX ADMIN — CEFTRIAXONE 2 G: 2 INJECTION, POWDER, FOR SOLUTION INTRAMUSCULAR; INTRAVENOUS at 10:12

## 2024-01-01 RX ADMIN — METOROPROLOL TARTRATE 2.5 MG: 5 INJECTION, SOLUTION INTRAVENOUS at 11:12

## 2024-01-01 RX ADMIN — FUROSEMIDE 40 MG: 10 INJECTION, SOLUTION INTRAVENOUS at 11:12

## 2024-01-01 RX ADMIN — METHYLPREDNISOLONE SODIUM SUCCINATE 125 MG: 125 INJECTION, POWDER, FOR SOLUTION INTRAMUSCULAR; INTRAVENOUS at 11:12

## 2024-01-01 RX ADMIN — ALBUTEROL SULFATE 10 MG: 2.5 SOLUTION RESPIRATORY (INHALATION) at 10:12

## 2024-03-27 ENCOUNTER — OFFICE VISIT (OUTPATIENT)
Dept: PODIATRY | Facility: CLINIC | Age: 89
End: 2024-03-27
Payer: MEDICARE

## 2024-03-27 VITALS
SYSTOLIC BLOOD PRESSURE: 156 MMHG | WEIGHT: 147.25 LBS | HEIGHT: 57 IN | HEART RATE: 88 BPM | BODY MASS INDEX: 31.77 KG/M2 | DIASTOLIC BLOOD PRESSURE: 70 MMHG

## 2024-03-27 DIAGNOSIS — R20.2 PARESTHESIA OF BOTH FEET: ICD-10-CM

## 2024-03-27 DIAGNOSIS — B35.3 TINEA PEDIS, LEFT: Primary | ICD-10-CM

## 2024-03-27 DIAGNOSIS — M21.6X9 MIDFOOT COLLAPSE, UNSPECIFIED LATERALITY: ICD-10-CM

## 2024-03-27 DIAGNOSIS — M79.675 PAIN DUE TO ONYCHOMYCOSIS OF TOENAIL OF LEFT FOOT: ICD-10-CM

## 2024-03-27 DIAGNOSIS — B35.1 PAIN DUE TO ONYCHOMYCOSIS OF TOENAIL OF LEFT FOOT: ICD-10-CM

## 2024-03-27 DIAGNOSIS — M51.37 DEGENERATIVE DISC DISEASE AT L5-S1 LEVEL: ICD-10-CM

## 2024-03-27 PROCEDURE — 99203 OFFICE O/P NEW LOW 30 MIN: CPT | Mod: S$GLB,,, | Performed by: PODIATRIST

## 2024-03-27 PROCEDURE — 99999 PR PBB SHADOW E&M-EST. PATIENT-LVL III: CPT | Mod: PBBFAC,,, | Performed by: PODIATRIST
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
## 2024-03-27 NOTE — PATIENT INSTRUCTIONS
Over the counter pain creams: Voltaren Gel, Biofreeze, Bengay, tiger balm, two old goat, lidocaine gel,  Absorbine Veterinary Liniment Gel Topical Analgesic Sore Muscle and Joint Pain Relief    Recommend lotions: eucerin, eucerin for diabetics, aquaphor, A&D ointment, gold bond for diabetics, sween, Brooklyn's Bees all purpose baby ointment,  urea 40 with aloe or SkinIntegra rapid crack repair (found on amazon.com)    Shoe recommendations: (try 6pm.com, zappos.com , nordstromrack.GreenPal, or shoes.com for discounted prices) you can visit varsity shoes in [place], DSW shoes in [place]  or sarita rack in the [facility] (there are also several shoe brand outlets in the [facility])    ONLY purchase stability style tennis shoes NO flex, foam, free, yoga mat style shoes    Shoe examples:    Asics (GT 4000 or gel foundations), new balance stability type shoes (such as the 940 series), saucony (stabil c3),  Hidalgo (GTS or Beast or   transcend), propet, HokaOne (tennis shoe) Benton (tennis shoes and boots)    Sofft Brand (women) Moises&Espinoza (men), clarks, crocs, aerosoles, naturalizers, SAS, ecco, born, ricki gutiérrez, rockports (dress shoes)    Vionic, burkenstocks, fitflops, propet, taos, baretraps (sandals)    HokaOne sandals, crocs, propet (house shoes)      Nail Home remedy:  Vicks Vapor rub or Emuaid to nails for easier manageability      Wearing Proper Shoes                    You walk on your feet every day, forcing them to support the weight of your body. Repeated stress on your feet can cause damage over time. The right shoes can help protect your feet. The wrong shoes can cause more foot problems. Read the information below to help you find a shoe that fits your foot needs.      A good shoe fit will cover your foot outline. A shoe that doesnt cover the outline is a bad fit.   Whats your foot shape?  To get a good fit, you need to know the shape of your foot. Do this simple test: While standing, place your foot on a  piece of paper and trace around it. Is your foot straight or curved? Do you have a foot problem, such as a bunion, that causes your foot outline to show a bulge on the side of your big toe?  Finding your fit  Bring your foot outline to the shoe store to help you find the right shoe. Place a shoe you like on top of the outline to see if it matches the shape. The shoe should cover the outline. (If you have a bunion, the shoe may not cover the bulge on the outline. Look for soft leather shoes to stretch over the bunion.) Once youve found a pair of proper shoes, put them on. Walk around. Be sure the shoes dont rub or pinch. If the shoes feel good, youve found your fit!  The right shoe for you  A good shoe has features that provide comfort and support. It must also be the right size and shape for your feet. Look for a shoe made of breathable fabric and lining, such as leather or canvas. Be sure that shoes have enough tread to prevent slipping. Go to a good shoe store for help finding the right shoe.  Good shoe features  An ideal shoe has the following:  Laces for support. If tying laces is a problem for you, try shoes with Velcro fasteners or alan.  A front of the shoe (toe box) with ½ inch space in front of your longest toes.  An arch shape that supports your foot.  No more than 1½ inches of heel.  A stiff, snug back of the shoe to keep your foot from sliding around.  A smooth lining with no rough seams.  Shoe shopping tips  Below are some dos and donts for when you go to the shoe store.  Do:  Select the shoes that feel right. Wear them around the house. Then bring them to your foot healthcare provider to check for fit. If they dont fit well, return them.  Shop late in the day, when your feet will be slightly bigger.  Each time you buy shoes, have both your feet measured while you are standing. Foot size changes with time.  Pick shoes to suit their purpose. High heels are OK for an occasional night on the town.  But for everyday wear, choose a more sensible shoe.  Try on shoes while wearing any inserts specially made for your feet (orthoses).  Try on both the right and left shoes. If your feet are different sizes, pick a pair that fits the larger foot.  Dont:  Dont buy shoes based on shoe size alone. Always try on shoes, as sizes differ from brand to brand and within brands.  Dont expect shoes to break in. If they dont fit at the store, dont buy them.  Dont buy a shoe that doesnt match your foot shape.  What about socks?  Always wear socks with shoes. Socks help absorb sweat and reduce friction and blistering. When shopping for shoes, choose soft, padded socks with seams that dont irritate your feet.  If you have foot problems  Some foot problems cause deformities. This can make it hard to find a good fit. Look for shoes made of soft leather to stretch over the deformity. If you have bunions, buy shoes with a wider toe box. To fit hammertoes, look for shoes with a tall toe box. If you have arch problems, you may need inserts. In some cases, youll need to have custom footwear or orthoses made for your feet.  Suggested footwear  Ask your healthcare provider what kind of footwear you need. He or she may recommend a certain brand or shoe store.  Date Last Reviewed: 8/1/2016  © 5473-1033 [employer] [address] [place] [address]. All rights reserved. This information is not intended as a substitute for professional medical care. Always follow your healthcare professional's instructions.        Step-by-Step:  Inspecting Your Feet   Date Last Reviewed: 10/1/2016  © 0403-4758 [employer] [address] [place] [address]. All rights reserved. This information is not intended as a substitute for professional medical care. Always follow your healthcare professional's instructions.

## 2024-12-02 PROBLEM — J96.01 ACUTE HYPOXIC RESPIRATORY FAILURE: Status: ACTIVE | Noted: 2024-01-01

## 2024-12-02 PROBLEM — Z71.89 ADVANCE CARE PLANNING: Status: ACTIVE | Noted: 2024-01-01

## 2024-12-02 NOTE — HPI
89-year-old female past medical history of chronic lower back pain on tramadol presenting today secondary to cough and shortness of breath ongoing for the past week.  Also been having decreased p.o. intake and feeling weaker.  No new pain.  Chronic pain in her arms and legs secondary to arthritis.  Takes tramadol.  Does not take any other medications.  Not on home oxygen.  Has been coughing up phlegm.  No abdominal pain or nausea or vomiting.  No chest pain.  Generalized weakness but nothing focal.  No falls or trauma.  Here with family.     Per OSH notes:  Patient Active Problem List   Diagnosis Date Noted   Interstitial lung disease (CMS/HCC) 05/22/2023   Shortness of breath 05/22/2023   Left ventricular hypertrophy 05/22/2023   Chronic cough 04/27/2023   Chronic pain syndrome 04/27/2023   Degenerative disc disease at L5-S1 level 01/31/2018      Past Surgical History:   Procedure Laterality Date   APPENDECTOMY   HYSTERECTOMY      Current Outpatient Medications:   albuterol sulfate (PROAIR HFA) 90 mcg/actuation inhaler, Inhale 2 puffs into the lungs every 6 (six) hours as needed for Wheezing or Shortness of Breath, Disp: 1 each, Rfl: 0  cyanocobalamin (VITAMIN B-12) 100 MCG tablet, Take 1 tablet by mouth daily, Disp: , Rfl:   ergocalciferol (DRISDOL) 1,250 mcg (50,000 unit) capsule, Take 1 capsule by mouth once a week, Disp: 4 capsule, Rfl: 0  multivitamin with minerals tablet, Take 1 tablet by mouth daily, Disp: , Rfl:   traMADoL (ULTRAM) 50 mg tablet, Take 1 tablet by mouth every 6 (six) hours, Disp: , Rfl:

## 2024-12-02 NOTE — ED NOTES
"Pt to ED today with SOB, per pts daughter, pt has been feeling SOB for approx 4 days now. Pt reports that she has never been sick like this before. Per daughter pt usually has cough/sob around this time of the year related to the weather, per pts daughter she reports that usually "codeine cough syrup usually knocks her symptoms out, not this time around." Pt was unable to see a PCP due to holiday and weekend. Pt is grunting and satting 91% on 5L.  Per daughter pt has not been eating or drinking like usual.  + for productive cough, green/yellowish phlegm.   "

## 2024-12-02 NOTE — ED PROVIDER NOTES
Encounter Date: 12/2/2024       History     Chief Complaint   Patient presents with    Shortness of Breath     Cough x 1 week with SOB worsening. O2 81% on arrival. Pt brought to room 1 and placed on 5L NC, sats up to 91%.      89-year-old female past medical history of chronic lower back pain on tramadol presenting today secondary to cough and shortness of breath ongoing for the past week.  Also been having decreased p.o. intake and feeling weaker.  No new pain.  Chronic pain in her arms and legs secondary to arthritis.  Takes tramadol.  Does not take any other medications.  Not on home oxygen.  Has been coughing up phlegm.  No abdominal pain or nausea or vomiting.  No chest pain.  Generalized weakness but nothing focal.  No falls or trauma.  Here with family.    Per OSH notes:  Patient Active Problem List   Diagnosis Date Noted   Interstitial lung disease (CMS/HCC) 05/22/2023   Shortness of breath 05/22/2023   Left ventricular hypertrophy 05/22/2023   Chronic cough 04/27/2023   Chronic pain syndrome 04/27/2023   Degenerative disc disease at L5-S1 level 01/31/2018     Past Surgical History:   Procedure Laterality Date   APPENDECTOMY   HYSTERECTOMY     Current Outpatient Medications:   albuterol sulfate (PROAIR HFA) 90 mcg/actuation inhaler, Inhale 2 puffs into the lungs every 6 (six) hours as needed for Wheezing or Shortness of Breath, Disp: 1 each, Rfl: 0  cyanocobalamin (VITAMIN B-12) 100 MCG tablet, Take 1 tablet by mouth daily, Disp: , Rfl:   ergocalciferol (DRISDOL) 1,250 mcg (50,000 unit) capsule, Take 1 capsule by mouth once a week, Disp: 4 capsule, Rfl: 0  multivitamin with minerals tablet, Take 1 tablet by mouth daily, Disp: , Rfl:   traMADoL (ULTRAM) 50 mg tablet, Take 1 tablet by mouth every 6 (six) hours, Disp: , Rfl:           Review of patient's allergies indicates:  No Known Allergies  Past Medical History:   Diagnosis Date    Arthritis     Chronic lower back pain      Past Surgical History:    Procedure Laterality Date    APPENDECTOMY      HYSTERECTOMY       Family History   Problem Relation Name Age of Onset    Rectal cancer Mother      Alcohol abuse Father       Social History     Tobacco Use    Smoking status: Never    Smokeless tobacco: Never   Substance Use Topics    Alcohol use: No    Drug use: No     Review of Systems    Physical Exam     Initial Vitals   BP Pulse Resp Temp SpO2   12/02/24 0934 12/02/24 0930 12/02/24 0930 12/02/24 0951 12/02/24 0930   120/63 (!) 148 (!) 50 97.9 °F (36.6 °C) (!) 81 %      MAP       --                Physical Exam    ED Course   Procedures  Labs Reviewed   CBC W/ AUTO DIFFERENTIAL - Abnormal       Result Value    WBC 37.46 (*)     RBC 4.88      Hemoglobin 14.8      Hematocrit 44.0      MCV 90      MCH 30.3      MCHC 33.6      RDW 13.0      Platelets 349      MPV 10.0      Immature Granulocytes 1.8 (*)     Gran # (ANC) 33.3 (*)     Immature Grans (Abs) 0.66 (*)     Lymph # 1.5      Mono # 1.8 (*)     Eos # 0.0      Baso # 0.17      nRBC 0      Gran % 88.9 (*)     Lymph % 4.0 (*)     Mono % 4.7      Eosinophil % 0.1      Basophil % 0.5      Differential Method Automated     COMPREHENSIVE METABOLIC PANEL - Abnormal    Sodium 136      Potassium 4.1      Chloride 101      CO2 17 (*)     Glucose 148 (*)     BUN 15      Creatinine 1.0      Calcium 9.9      Total Protein 7.3      Albumin 2.5 (*)     Total Bilirubin 1.0      Alkaline Phosphatase 95      AST 51 (*)     ALT 14      eGFR 55 (*)     Anion Gap 18 (*)    LACTIC ACID, PLASMA - Abnormal    Lactate (Lactic Acid) 6.5 (*)     Narrative:       Lactic Acid  critical result(s) called and verbal readback obtained   from Delroy Doran. by KIN 12/02/2024 12:48   TROPONIN I - Abnormal    Troponin I 3.612 (*)    B-TYPE NATRIURETIC PEPTIDE - Abnormal     (*)    ISTAT LACTATE - Abnormal    POC Lactate 4.66 (*)     Sample VENOUS      Site Reynaldo/UAC      Allens Test N/A     CULTURE, BLOOD    Blood Culture, Routine No Growth  to date      Narrative:     Aerobic and anaerobic   CULTURE, BLOOD    Blood Culture, Routine No Growth to date      Narrative:     Aerobic and anaerobic   POCT INFLUENZA A/B MOLECULAR    POC Molecular Influenza A Ag Negative      POC Molecular Influenza B Ag Negative       Acceptable Yes     SARS-COV-2 RDRP GENE    POC Rapid COVID Negative       Acceptable Yes       EKG Readings: (Independently Interpreted)   EKG done 934 showing sinus tachycardia with a rate of 143.  No ST elevation.  Wavy baseline.  Normal axis.  Old septal infarct.  QRS 64 and QTC is 413.  Abnormal nonspecific EKG.  Not tachycardic compared to previous     EKG done at 10:50 a.m. showing sinus tachycardia rate of 169.  No ST elevation.  Wavy baseline.  Normal axis.  Old septal infarct.  QRS is 64 and QTC is 452.  Abnormal EKG.      EKG done 1131 showing atrial fibrillation versus sinus tach.  P waves not consistently prominent.  Rate of 171.  Normal axis.  Low-voltage QRS.  Old septal infarct.  Fairly similar to previous albeit P waves not as prominent       Imaging Results              X-Ray Chest AP Portable (Final result)  Result time 12/02/24 12:14:29      Final result by Adriano Caruso MD (12/02/24 12:14:29)                   Impression:      Diffuse interstitial and alveolar opacities could relate to edema versus infectious or noninfectious inflammatory process.    Nonspecific apparent masslike opacity in the left mid lung zone measuring up to 55 mm.  Finding could relate to nonspecific alveolar consolidation, noting that neoplasm not excluded.  Continued attention on follow-up recommended.  CT would provide improved characterization.      Electronically signed by: Adriano Caruso  Date:    12/02/2024  Time:    12:14               Narrative:    EXAMINATION:  XR CHEST AP PORTABLE    CLINICAL HISTORY:  Sepsis;    TECHNIQUE:  Single frontal view of the chest was performed.    COMPARISON:  Chest radiograph  performed 05/09/2018, 14:22 hours.    FINDINGS:  Monitoring leads over the chest.  Grossly unchanged cardiac contours, again noting enlargement of the cardiac silhouette and prominence of central pulmonary vasculature.    Diffuse interstitial coarsening is noted bilaterally.  Patchy alveolar opacities are seen.    Question masslike opacity in the left mid lung zone measuring up to 55 mm.    No definite pneumothorax.  Trace pleural effusions are not excluded.    No acute findings in the visualized abdomen    Gaseous distention of stomach.    Osseous and soft tissue structures appear without definite acute change.                                       Medications   ipratropium 0.02 % nebulizer solution 1 mg (1 mg Nebulization Given 12/2/24 1000)   albuterol sulfate nebulizer solution 10 mg (10 mg Nebulization Given 12/2/24 1000)   sodium chloride 0.9% bolus 1,365 mL 1,365 mL (0 mLs Intravenous Stopped 12/2/24 1056)   cefTRIAXone injection 2 g (2 g Intravenous Given 12/2/24 1022)   azithromycin (ZITHROMAX) 500 mg in 0.9% NaCl 250 mL IVPB (admixture device) (0 mg Intravenous Stopped 12/2/24 1134)   methylPREDNISolone sodium succinate injection 125 mg (125 mg Intravenous Given 12/2/24 1114)   furosemide injection 40 mg (40 mg Intravenous Given 12/2/24 1123)   metoprolol injection 2.5 mg (2.5 mg Intravenous Given 12/2/24 1140)     Medical Decision Making  Please put in 35 minutes of critical care due to patient having a high risk of cardiac and respiratory failure.   Separate from teaching and exclusive of procedure and ekg time  Includes:  Time at bedside  Time reviewing test results  Time discussing case with staff  Time documenting the medical record  Time spent with family members  Time spent with consults  Management    This patient does have evidence of infective focus  My overall impression is sepsis.  Source: Respiratory  Antibiotics given- Antibiotics (72h ago, onward)    Start     Stop Route Frequency Ordered     "24  cefTRIAXone injection 2 g  (ED Adult Sepsis Treatment)           -- IV Once 24  azithromycin (ZITHROMAX) 500 mg in 0.9% NaCl 250 mL IVPB   (admixture device)  (ED Adult Sepsis Treatment)         -- IV Once 24      Latest lactate reviewed-  No results for input(s): "LACTATE", "POCLAC" in the last 72 hours.  Organ dysfunction indicated by acute respiratory failure    Fluid challenge Ideal Body Weight- The patient's ideal body weight is Ideal body weight: 45.5 kg (100 lb 5.7 oz) which will be used to calculate fluid bolus of 30 ml/kg for treatment of septic shock.      Post- resuscitation assessment Yes Perfusion exam was performed within 6 hours of septic shock presentation after bolus shows Adequate tissue perfusion assessed by non-invasive monitoring       Will Not start Pressors- Levophed for MAP of 65  Source control achieved by:  Ceftriaxone azithromycin    89-year-old female presenting secondary shortness breath.  Hypoxic.  Initially concerned about sepsis in the patient started on septic protocol.  Get chest x-ray immediately started fluids and started diuresing.  White spectrum antibiotics.  Had conversation with family and they will discuss intubation due to worsening respiratory status.  At this moment they did not want intubation.  Palliative care at bedside.    Appreciate help with palliative care.  They state that patient is DNR DNI.  Critical care coming down to see patient.    Lactic acid elevated.  Repeat lactic acid worsening.  Elevated white count. Troponin elevated.    I was informed that patient  while I was in another room seeing the patient.  Family were updated.  Time of death below.      Amount and/or Complexity of Data Reviewed  Labs: ordered.  Radiology: ordered.    Risk  Prescription drug management.               ED Course as of 24 1844   Mon Dec 02, 2024   1101 Chest x-ray done.  Chest x-ray looks wet.  Stopped IV fluids.  " Ordered Lasix.  Patient more tachypneic.  Discussed BiPAP and intubation.  Patient family would like to start on BiPAP.  Family and patient having goals of life discussion.  Palliative care consult placed. [BA]   1138 Palliative care and ICU speaking with patient.  Patient now DNR and DNI.  Due to possible atrial fibrillation versus sinus tachycardia discussed with ICU versus treating versus leaving alone.  Blood pressure is 130 systolic.  Recommend metoprolol. continuing BiPAP [BA]   1152 While I was in another room talking to another patient ICU team informed me that she went into PEA arrest while they were in the room with the patient and the time of death at 11:47 a.m..  They reconfirmed with family DNR and DNI. [BA]      ED Course User Index  [BA] Amadeo Magaña MD                 Medical Decision Making:   Clinical Tests:   Sepsis Perfusion Assessment: "I attest a sepsis perfusion exam was performed within 6 hours of sepsis, severe sepsis, or septic shock presentation, following fluid resuscitation."             Clinical Impression:  Final diagnoses:  [R00.0] Tachycardia  [J81.0] Acute pulmonary edema (Primary)  [I46.9] Cardiopulmonary arrest  [R06.03] Respiratory distress  [J96.01] Acute respiratory failure with hypoxia          ED Disposition Condition                     Amadeo Magaña MD  24 3050

## 2024-12-02 NOTE — ED NOTES
Dr Gotti with palliative care at bedside for eval and family update. Daughter Estrella agrees pt would want to be DNR/DNI. Pt unresponsive at this time.

## 2024-12-02 NOTE — SUBJECTIVE & OBJECTIVE
"  Past Medical History:   Diagnosis Date    Arthritis     Chronic lower back pain        Past Surgical History:   Procedure Laterality Date    APPENDECTOMY      HYSTERECTOMY         Review of patient's allergies indicates:  No Known Allergies    Medications:  Continuous Infusions:  Scheduled Meds:  PRN Meds:    Family History       Problem Relation (Age of Onset)    Alcohol abuse Father    Rectal cancer Mother          Tobacco Use    Smoking status: Never    Smokeless tobacco: Never   Substance and Sexual Activity    Alcohol use: No    Drug use: No    Sexual activity: Never     Partners: Male     Comment:        Review of Systems   Unable to perform ROS: Acuity of condition     Objective:     Vital Signs (Most Recent):  Temp: 97.9 °F (36.6 °C) (12/02/24 0951)  Pulse: (!) 0 (12/02/24 1150)  Resp: (!) 93 (12/02/24 1129)  BP: 135/82 (12/02/24 1137)  SpO2: (!) 81 % (12/02/24 1137) Vital Signs (24h Range):  Temp:  [97.9 °F (36.6 °C)] 97.9 °F (36.6 °C)  Pulse:  [0-171] 0  Resp:  [39-93] 93  SpO2:  [81 %-96 %] 81 %  BP: (102-135)/(55-82) 135/82     Weight: 55.8 kg (123 lb)  Body mass index is 26.62 kg/m².       Physical Exam  Constitutional:       Comments: Elderly and frail appearing, wearing BIPAP, in respiratory distress        Significant Labs: All pertinent labs within the past 24 hours have been reviewed.  CBC:   Recent Labs   Lab 12/02/24  0954   WBC 37.46*   HGB 14.8   HCT 44.0   MCV 90        BMP:  Recent Labs   Lab 12/02/24  0954   *      K 4.1      CO2 17*   BUN 15   CREATININE 1.0   CALCIUM 9.9     LFT:  Lab Results   Component Value Date    AST 51 (H) 12/02/2024    ALKPHOS 95 12/02/2024    BILITOT 1.0 12/02/2024     Albumin:   Albumin   Date Value Ref Range Status   12/02/2024 2.5 (L) 3.5 - 5.2 g/dL Final     Protein:   Total Protein   Date Value Ref Range Status   12/02/2024 7.3 6.0 - 8.4 g/dL Final     Lactic acid:   No results found for: "LACTATE"    Significant Imaging: I " have reviewed all pertinent imaging results/findings within the past 24 hours.

## 2024-12-02 NOTE — ASSESSMENT & PLAN NOTE
- Consult for support and advance care planning in pt brought in to ER with shortness of breath and weakness; at time of consult pt was undergoing evaluation for acute respiratory failure, and had been placed on BIPAP. Chart reviewed; discussed pt in person with ER staff, Dr Magaña.   - During visit to bedside, pt looked uncomfortable, wearing BIPAP and with significantly increased work of breathing and tachycardia (HR 160s). Called and spoke with her daughter, Estrella; arranged to meet with her back in pt's room immediately after this.   - Introduced role of palliative medicine to Estrella, and learned more about pt outside of hospital. Pt has a total of 4 children (1 ), with Estrella living locally, sister lives across [facility], and their brother lives in [place]. While previously doing well in terms of her health, she notes a decline over last month or two; pt with increasing shortness of breath at times, as well as decreased appetite.   - Medical update provided, and discussed that we are at a crossroads in terms of next steps in pt's care. Explained one option would be to place pt on a ventilator; further elaborated that this is a form of life support and pt would be sedated and in a medical coma. Alternatively, if not wanting to go through this, we could continue with current interventions though with DNR/DNI limitation in place. She responded that pt would never want to be on a ventilator; told her that is very reasonable, and let her know we would update pt's code status to DNR/DNI.   - Given tenuousness of pt's status, encouraged her to have her family come in to hospital as soon as possible  - Emotional support provided during visit  - Updated ER staff and PCCM team in person after this conversation; sadly, pt became unresponsive and  later in the day, with family at bedside.

## 2024-12-02 NOTE — CONSULTS
Lucila Myers has a past medical history that includes:  Chronic low back pain, interstitial lung disease, left ventricular hypertrophy, diastolic heart failure, chronic cough, chronic pain syndrome, who presents in acute respiratory distress.  Initially triaged as sepsis and given antibiotics with IV fluids.  Noted to be in a. Fib. Placed on BiPAP therapy.  Chest x-ray revealed interstitial markings concerning for pulmonary edema vs ILD exacerbation.  She was in severe respiratory distress and the patient became obtunded while on BIPAP.  A discussion was held with family at bedside and resulted in the decision to make the patient DNR/DNI.  Lasix and low-dose metoprolol was given by the ER in an attempt to control rate and optimize volume status.  Despite these measures she became progressively obtunded and ventilation stopped.  This was followed by PEA and then VFib.  Given patient's code status she was allowed to peacefully . BIPAP was removed and patient was without respiratory efforts, was Non-responsive to noxious stimuli, and had absent cardiac activity on bedside US. Pupils fixed.  Family at bedside.  My sincere condolences were given.    Time of death: 11:47    Cause of death: acute hypoxemic respiratory failure secondary to interstitial lung disease.    Critical care time 50 minutes    Willem Daley MD  [facility]

## 2024-12-02 NOTE — CONSULTS
[facility] - Emergency Dept  Palliative Medicine  Consult Note    Patient Name: Lucila Myers  MRN: [id number]  Admission Date: 2024  Hospital Length of Stay: 0 days  Code Status: DNR   Attending Provider: Amadeo Magaña MD  Consulting Provider: Gisell Gotti MD  Primary Care Physician: Gerald Raines -  Principal Problem:<principal problem not specified>    Patient information was obtained from relative(s), past medical records, ER records, and primary team.      Inpatient consult to Palliative Care  Consult performed by: Gisell Gotti MD  Consult ordered by: Amadeo Magaña MD  Reason for consult: Advance Care Planning        Assessment/Plan:     Advance Care Planning    - Consult for support and advance care planning in pt brought in to ER with shortness of breath and weakness; at time of consult pt was undergoing evaluation for acute respiratory failure, and had been placed on BIPAP. Chart reviewed; discussed pt in person with ER staff, Dr Magaña.   - During visit to bedside, pt looked uncomfortable, wearing BIPAP and with significantly increased work of breathing and tachycardia (HR 160s). Called and spoke with her daughter, Estrella; arranged to meet with her back in pt's room immediately after this.   - Introduced role of palliative medicine to Estrella, and learned more about pt outside of hospital. Pt has a total of 4 children (1 ), with Estrella living locally, sister lives across [facility], and their brother lives in [place]. While previously doing well in terms of her health, she notes a decline over last month or two; pt with increasing shortness of breath at times, as well as decreased appetite.   - Medical update provided, and discussed that we are at a crossroads in terms of next steps in pt's care. Explained one option would be to place pt on a ventilator; further elaborated that this is a form of life support and pt would be sedated and in a medical coma. Alternatively, if not  "wanting to go through this, we could continue with current interventions though with DNR/DNI limitation in place. She responded that pt would never want to be on a ventilator; told her that is very reasonable, and let her know we would update pt's code status to DNR/DNI.   - Given tenuousness of pt's status, encouraged her to have her family come in to hospital as soon as possible  - Emotional support provided during visit  - Updated ER staff and PCCM team in person after this conversation; sadly, pt became unresponsive and  later in the day, with family at bedside.  was notified and was in room with family when I went back to ER.     Pulmonary  Acute hypoxic respiratory failure  - Was undergoing evaluation for this per ER staff and PCCM team     Thank you for your consult. Please let me know if any assistance is needed.     EMMANUEL Aguillon  Palliative Medicine Staff   [phone]    > 50% of 70 min visit spent in chart review, face to face discussion of symptom assessment, coordination of care with other specialists, goals of care, emotional support, formulating and communicating prognosis, exploring burden/ benefit of various approaches of treatment.      Subjective:     HPI:   (From ER notes)  "89-year-old female past medical history of chronic lower back pain on tramadol presenting today secondary to cough and shortness of breath ongoing for the past week.  Also been having decreased p.o. intake and feeling weaker.  No new pain.  Chronic pain in her arms and legs secondary to arthritis.  Takes tramadol.  Does not take any other medications.  Not on home oxygen.  Has been coughing up phlegm.  No abdominal pain or nausea or vomiting.  No chest pain.  Generalized weakness but nothing focal.  No falls or trauma.  Here with family."    Palliative medicine is consulted for support and advance care planning; see ACP section of plan.     Past Medical History:   Diagnosis Date    Arthritis     Chronic " "lower back pain        Past Surgical History:   Procedure Laterality Date    APPENDECTOMY      HYSTERECTOMY         Review of patient's allergies indicates:  No Known Allergies    Medications:  Continuous Infusions:  Scheduled Meds:  PRN Meds:    Family History       Problem Relation (Age of Onset)    Alcohol abuse Father    Rectal cancer Mother          Tobacco Use    Smoking status: Never    Smokeless tobacco: Never   Substance and Sexual Activity    Alcohol use: No    Drug use: No    Sexual activity: Never     Partners: Male     Comment:        Review of Systems   Unable to perform ROS: Acuity of condition     Objective:     Vital Signs (Most Recent):  Temp: 97.9 °F (36.6 °C) (12/02/24 0951)  Pulse: (!) 0 (12/02/24 1150)  Resp: (!) 93 (12/02/24 1129)  BP: 135/82 (12/02/24 1137)  SpO2: (!) 81 % (12/02/24 1137) Vital Signs (24h Range):  Temp:  [97.9 °F (36.6 °C)] 97.9 °F (36.6 °C)  Pulse:  [0-171] 0  Resp:  [39-93] 93  SpO2:  [81 %-96 %] 81 %  BP: (102-135)/(55-82) 135/82     Weight: 55.8 kg (123 lb)  Body mass index is 26.62 kg/m².       Physical Exam  Constitutional:       Comments: Elderly and frail appearing, wearing BIPAP, in respiratory distress        Significant Labs: All pertinent labs within the past 24 hours have been reviewed.  CBC:   Recent Labs   Lab 12/02/24  0954   WBC 37.46*   HGB 14.8   HCT 44.0   MCV 90        BMP:  Recent Labs   Lab 12/02/24  0954   *      K 4.1      CO2 17*   BUN 15   CREATININE 1.0   CALCIUM 9.9     LFT:  Lab Results   Component Value Date    AST 51 (H) 12/02/2024    ALKPHOS 95 12/02/2024    BILITOT 1.0 12/02/2024     Albumin:   Albumin   Date Value Ref Range Status   12/02/2024 2.5 (L) 3.5 - 5.2 g/dL Final     Protein:   Total Protein   Date Value Ref Range Status   12/02/2024 7.3 6.0 - 8.4 g/dL Final     Lactic acid:   No results found for: "LACTATE"    Significant Imaging: I have reviewed all pertinent imaging results/findings within the " past 24 hours.

## 2024-12-04 LAB
OHS QRS DURATION: 62 MS
OHS QRS DURATION: 64 MS
OHS QTC CALCULATION: 413 MS
OHS QTC CALCULATION: 482 MS

## 2024-12-06 LAB
BACTERIA BLD CULT: ABNORMAL
BACTERIA BLD CULT: NORMAL